# Patient Record
Sex: FEMALE | Race: WHITE | Employment: UNEMPLOYED | URBAN - METROPOLITAN AREA
[De-identification: names, ages, dates, MRNs, and addresses within clinical notes are randomized per-mention and may not be internally consistent; named-entity substitution may affect disease eponyms.]

---

## 2018-01-23 ENCOUNTER — APPOINTMENT (OUTPATIENT)
Dept: PHYSICAL THERAPY | Facility: CLINIC | Age: 53
End: 2018-01-23
Payer: COMMERCIAL

## 2018-01-23 PROCEDURE — 97162 PT EVAL MOD COMPLEX 30 MIN: CPT

## 2018-01-23 PROCEDURE — G8979 MOBILITY GOAL STATUS: HCPCS

## 2018-01-23 PROCEDURE — G8978 MOBILITY CURRENT STATUS: HCPCS

## 2018-02-15 ENCOUNTER — EVALUATION (OUTPATIENT)
Dept: PHYSICAL THERAPY | Facility: CLINIC | Age: 53
End: 2018-02-15
Payer: COMMERCIAL

## 2018-02-15 DIAGNOSIS — M25.551 RIGHT HIP PAIN: Primary | ICD-10-CM

## 2018-02-15 PROCEDURE — 97161 PT EVAL LOW COMPLEX 20 MIN: CPT

## 2018-02-15 PROCEDURE — G8978 MOBILITY CURRENT STATUS: HCPCS

## 2018-02-15 PROCEDURE — G8979 MOBILITY GOAL STATUS: HCPCS

## 2018-02-15 NOTE — PROGRESS NOTES
PT Re-Evaluation     Today's date: 2/15/2018  Patient name: Al Mesa  : 1965  MRN: [id number]  Referring provider: Reshma Duval MD  Dx:   Encounter Diagnosis   Name Primary?  Right hip pain Yes                  Assessment    Assessment details: Pt is a pleasant 50 y o  female who presents to [facility] PT s/p R hip arthroscopic repair for meniscus on 2018  Today, she presents with decreased and painful R hip ROM, R hip and core strength/stability, high self reports of pain w/ activity, and decreased tolerance to all activities  She is not yet driving on own  Functionally, she is limited in her ability to stand and ambulate, perform functional transfers, negotiate stairs, sleep on R side, and perform normal recreational activities  She is limited by low back pain at this time as well  Pt will benefit from skilled PT to address the aforementioned deficits and limitations in an effort to maximize pain free functional mobility and overall quality of life  Progress as able with these goals in mind  Understanding of Dx/Px/POC: good   Prognosis: good    Goals  Short term goals (3 weeks):  1) Pt will improve R hip ROM deficits by at least 10 degrees each pain free  2) Pt will improve R hip and core strength deficits by 1/3 grade MMT  3)  Pt will minimize soft tissue density restrictions through R anterior/lateral/posterior hip  4) Pt will initiate and progress HEP w/ special emphasis on     Long term goals (6 weeks)  1) Pt will improve FOTO to at least 70  2) Pt will stand and ambulate for durations greater than 30 min w/ normalized gait mechanics and pain <3/10 throughout  3) Pt will be functionally unlimited w/ stair climbing     4) Pt will be independent and compliant w/ HEP in order to maximize functional benefit of skilled PT following d/c        Plan  Patient would benefit from: skilled PT  Planned modality interventions: cryotherapy and thermotherapy: hydrocollator packs  Planned therapy interventions: activity modification, abdominal trunk stabilization, manual therapy, massage, transfer training, therapeutic training, therapeutic exercise, therapeutic activities, stretching, strengthening, patient education, neuromuscular re-education, home exercise program, graded exercise, graded activity, gait training, functional ROM exercises, flexibility, body mechanics training and balance  Frequency: 2x week  Duration in visits: 12  Duration in weeks: 6  Treatment plan discussed with: patient  Plan details: POC to include: heat or active w/u, B LE stretching, supine core stab and hip stab, standing gait and balance training, stair training     HEP to start: add squeezes, bridging, SLR, sit<>stands          Subjective Evaluation    History of Present Illness  Date of surgery: 2018  Mechanism of injury: Pt had arthroscopic surgery on 2018 to repair torn labrum  Was seen previously for crutch training session prior to surgery  Born with congenital hip dysplasia and notes that this caused pain for many years  Pt initially felt really good following surgery, has been more sore over the last week  Notes weakness in R hip that causes difficulty w/ movement, especially walking  Pain only comes when she tires and her mechanics change  Excited to get into formal therapy   Functional status is as follows:   Quality of life: good    Pain  Current pain ratin  At best pain ratin  At worst pain ratin  Location: R lateral hip  Quality: throbbing, sharp and burning  Relieving factors: medications, rest and change in position  Aggravating factors: standing, walking and stair climbing  Progression: improved    Social Support  Steps to enter house: yes  Stairs in house: yes   Lives in: multiple-level home  Lives with: spouse    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, works in school system )    Diagnostic Tests  No diagnostic tests performed  Patient Goals  Patient goals for therapy: increased strength, decreased pain, increased motion, return to sport/leisure activities and return to work  Patient goal: I would like to walk without being afraid of falling, I want to get my socks on without help  Objective     Palpation     Additional Palpation Details  Increased tissue density through R HF and glutes, min/mod TTP through R anterior and lateral hip  Neurological Testing     Sensation     Hip   Left Hip   Intact: light touch    Right Hip   Intact: light touch    Active Range of Motion   Left Hip   Normal active range of motion    Right Hip   Flexion: 95 degrees   Abduction: 35 degrees   External rotation (90/90): 45 degrees   Internal rotation (90/90): 18 degrees     Additional Active Range of Motion Details  L hip ROM limited by low back pain     Passive Range of Motion   Left Hip   Normal passive range of motion    Right Hip   Flexion: 105 degrees   Abduction: 40 degrees     Strength/Myotome Testing     Left Hip   Normal muscle strength    Right Hip   Planes of Motion   Flexion: 4-  Extension: 3+  Abduction: 3+  External rotation: 4-  Internal rotation: 4-    Isolated Muscles   Gluteus medius: 3+    Additional Strength Details  Core: no greater than 2/5 at this time, limited by low back discomfort  Ambulation     Ambulation: Level Surfaces     Additional Level Surfaces Ambulation Details  Mod R sided Trendelenburg, minimally antalgic, decreased step length and WB through R LE     Functional Assessment     Comments  Sit<>stand: decreased WB through R Le, min UE support, min antalgic, tires quickly        Precautions: Born w/ hip dysplasia     Daily Treatment Diary     Manual              LAD             "the stick" to quad, ITB                                                        Exercise Diary              SLR w/ QS             bridging             Hip add squeeze             Supine single leg fall outs (gentle)             Standing mini squats             Hip abd and ext             pball isos             pball squats              Stair training              Marching                                                                                                                                                    Modalities

## 2018-02-15 NOTE — LETTER
2018    Sammy Skinner MD  [address]  [address]  [address] [place]    Patient: Tish Galarza   YOB: 1965   Date of Visit: 2/15/2018     Encounter Diagnosis     ICD-10-CM    1  Right hip pain M25 551        Dear Dr Rhona Pace:    Please review the attached Plan of Care from Tish Galarza recent visit  Please verify that you agree therapy should continue by signing the attached document and sending it back to our office  If you have any questions or concerns, please don't hesitate to call  Sincerely,    Romelia Ham, PT      Referring Provider:      I certify that I have read the below Plan of Care and certify the need for these services furnished under this plan of treatment while under my care  Sammy Skinner MD  [address]  [facility] [address]: [phone]          PT Re-Evaluation     Today's date: 2/15/2018  Patient name: Tish Galarza  : 1965  MRN: [id number]  Referring provider: Cr Varghese MD  Dx:   Encounter Diagnosis   Name Primary?  Right hip pain Yes                  Assessment    Assessment details: Pt is a pleasant 50 y o  female who presents to [facility] PT s/p R hip arthroscopic repair for meniscus on 2018  Today, she presents with decreased and painful R hip ROM, R hip and core strength/stability, high self reports of pain w/ activity, and decreased tolerance to all activities  She is not yet driving on own  Functionally, she is limited in her ability to stand and ambulate, perform functional transfers, negotiate stairs, sleep on R side, and perform normal recreational activities  She is limited by low back pain at this time as well  Pt will benefit from skilled PT to address the aforementioned deficits and limitations in an effort to maximize pain free functional mobility and overall quality of life   Progress as able with these goals in mind        Understanding of Dx/Px/POC: good   Prognosis: good    Goals  Short term goals (3 weeks):  1) Pt will improve R hip ROM deficits by at least 10 degrees each pain free  2) Pt will improve R hip and core strength deficits by 1/3 grade MMT  3)  Pt will minimize soft tissue density restrictions through R anterior/lateral/posterior hip  4) Pt will initiate and progress HEP w/ special emphasis on     Long term goals (6 weeks)  1) Pt will improve FOTO to at least 70  2) Pt will stand and ambulate for durations greater than 30 min w/ normalized gait mechanics and pain <3/10 throughout  3) Pt will be functionally unlimited w/ stair climbing  4) Pt will be independent and compliant w/ HEP in order to maximize functional benefit of skilled PT following d/c        Plan  Patient would benefit from: skilled PT  Planned modality interventions: cryotherapy and thermotherapy: hydrocollator packs  Planned therapy interventions: activity modification, abdominal trunk stabilization, manual therapy, massage, transfer training, therapeutic training, therapeutic exercise, therapeutic activities, stretching, strengthening, patient education, neuromuscular re-education, home exercise program, graded exercise, graded activity, gait training, functional ROM exercises, flexibility, body mechanics training and balance  Frequency: 2x week  Duration in visits: 12  Duration in weeks: 6  Treatment plan discussed with: patient  Plan details: POC to include: heat or active w/u, B LE stretching, supine core stab and hip stab, standing gait and balance training, stair training     HEP to start: add squeezes, bridging, SLR, sit<>stands          Subjective Evaluation    History of Present Illness  Date of surgery: 1/30/2018  Mechanism of injury: Pt had arthroscopic surgery on 1/30/2018 to repair torn labrum  Was seen previously for crutch training session prior to surgery   Born with congenital hip dysplasia and notes that this caused pain for many years  Pt initially felt really good following surgery, has been more sore over the last week  Notes weakness in R hip that causes difficulty w/ movement, especially walking  Pain only comes when she tires and her mechanics change  Excited to get into formal therapy  Functional status is as follows:   Quality of life: good    Pain  Current pain ratin  At best pain ratin  At worst pain ratin  Location: R lateral hip  Quality: throbbing, sharp and burning  Relieving factors: medications, rest and change in position  Aggravating factors: standing, walking and stair climbing  Progression: improved    Social Support  Steps to enter house: yes  Stairs in house: yes   Lives in: multiple-level home  Lives with: spouse    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, works in school system )    Diagnostic Tests  No diagnostic tests performed  Patient Goals  Patient goals for therapy: increased strength, decreased pain, increased motion, return to sport/leisure activities and return to work  Patient goal: I would like to walk without being afraid of falling, I want to get my socks on without help  Objective     Palpation     Additional Palpation Details  Increased tissue density through R HF and glutes, min/mod TTP through R anterior and lateral hip      Neurological Testing     Sensation     Hip   Left Hip   Intact: light touch    Right Hip   Intact: light touch    Active Range of Motion   Left Hip   Normal active range of motion    Right Hip   Flexion: 95 degrees   Abduction: 35 degrees   External rotation (90/90): 45 degrees   Internal rotation (90/90): 18 degrees     Additional Active Range of Motion Details  L hip ROM limited by low back pain     Passive Range of Motion   Left Hip   Normal passive range of motion    Right Hip   Flexion: 105 degrees   Abduction: 40 degrees     Strength/Myotome Testing     Left Hip   Normal muscle strength    Right Hip   Planes of Motion   Flexion: 4-  Extension: 3+  Abduction: 3+  External rotation: 4-  Internal rotation: 4-    Isolated Muscles   Gluteus medius: 3+    Additional Strength Details  Core: no greater than 2/5 at this time, limited by low back discomfort  Ambulation     Ambulation: Level Surfaces     Additional Level Surfaces Ambulation Details  Mod R sided Trendelenburg, minimally antalgic, decreased step length and WB through R LE     Functional Assessment     Comments  Sit<>stand: decreased WB through R Le, min UE support, min antalgic, tires quickly        Precautions: Born w/ hip dysplasia     Daily Treatment Diary     Manual              LAD             "the stick" to quad, ITB                                                        Exercise Diary              SLR w/ QS             bridging             Hip add squeeze             Supine single leg fall outs (gentle)             Standing mini squats             Hip abd and ext             pball isos             pball squats              Stair training              Marching                                                                                                                                                    Modalities

## 2018-02-16 ENCOUNTER — TRANSCRIBE ORDERS (OUTPATIENT)
Dept: PHYSICAL THERAPY | Facility: CLINIC | Age: 53
End: 2018-02-16

## 2018-02-16 DIAGNOSIS — M25.551 RIGHT HIP PAIN: Primary | ICD-10-CM

## 2018-02-20 ENCOUNTER — OFFICE VISIT (OUTPATIENT)
Dept: PHYSICAL THERAPY | Facility: CLINIC | Age: 53
End: 2018-02-20
Payer: COMMERCIAL

## 2018-02-20 DIAGNOSIS — M25.551 RIGHT HIP PAIN: Primary | ICD-10-CM

## 2018-02-20 PROCEDURE — 97110 THERAPEUTIC EXERCISES: CPT

## 2018-02-20 PROCEDURE — 97140 MANUAL THERAPY 1/> REGIONS: CPT

## 2018-02-20 PROCEDURE — 97112 NEUROMUSCULAR REEDUCATION: CPT

## 2018-02-20 NOTE — PROGRESS NOTES
Daily Note     Today's date: 2018  Patient name: Deette Boast  : 1965  MRN: [id number]  Referring provider: Karl Garza MD  Dx:   Encounter Diagnosis     ICD-10-CM    1  Right hip pain M25 551                   Subjective: Pt reports soreness deep in R hip, not acutely painful but she is sore  Has been keeping up w/ exercises faithfully  Objective: Pt shows good form w/ pball mini squats through 25-33% of ROM, further ranges not attempted at this time  Precautions: Born w/ hip dysplasia     Daily Treatment Diary     Manual              LAD 2x2 min hold onR            "the stick" to quad, ITB, HS on R x8 mins total                                                       Exercise Diary              SLR w/ QS             bridging 2/ add squeeze 2x10            Hip add squeeze 3 sec hold x 20            Supine single leg fall outs (gentle) YTB 2x10 B            Standing mini squats             pball iso hip ext 2x10 B, marching 2x10 B            pball isos 3 sec x 10            pball squats  x30 total through 33%            Stair training              Marching              Self HF stretch in standing x30 sec            Self HS stretching sitting x30 sec            Stationary bike pre  x7 min                                                                                                            Modalities                                                         Assessment: Tolerated treatment well  Patient demonstrated fatigue post treatment and would benefit from continued PT  She shows good glute activation w/ continued reps and cueing, she does well to verbalize importance of glute strength throughout  She notes fatigue by end of session but shows good form  Continue w/ gentle progressions as able  Plan: Continue per plan of care   Pball hip abd, standing mini squats, heel raises, gastroc stretching, pball bridging, core stab

## 2018-02-20 NOTE — LETTER
2018    Venessa Benitez MD  [address]  [address]  [address] [facility]    Patient: Shalonda Gillette   YOB: 1965   Date of Visit: 2018     Encounter Diagnosis     ICD-10-CM    1  Right hip pain M25 551        Dear Dr Violetta Berry:    Please review the attached Plan of Care from Shalonda Gillette recent visit  Please verify that you agree therapy should continue by signing the attached document and sending it back to our office  If you have any questions or concerns, please don't hesitate to call  Sincerely,    Beth Burrows, PT      Referring Provider:      I certify that I have read the below Plan of Care and certify the need for these services furnished under this plan of treatment while under my care  Venessa Benitez MD  [address]  [facility] [address]: [phone]          Daily Note     Today's date: 2018  Patient name: Shalonda Gillette  : 1965  MRN: [id number]  Referring provider: Dre Silva MD  Dx:   Encounter Diagnosis     ICD-10-CM    1  Right hip pain M25 551                   Subjective: Pt reports soreness deep in R hip, not acutely painful but she is sore  Has been keeping up w/ exercises faithfully  Objective: Pt shows good form w/ pball mini squats through 25-33% of ROM, further ranges not attempted at this time      Precautions: Born w/ hip dysplasia     Daily Treatment Diary     Manual              LAD 2x2 min hold onR            "the stick" to quad, ITB, HS on R x8 mins total                                                       Exercise Diary              SLR w/ QS             bridging 2/ add squeeze 2x10            Hip add squeeze 3 sec hold x 20            Supine single leg fall outs (gentle) YTB 2x10 B            Standing mini squats             pball iso hip ext 2x10 B, marching 2x10 B            pball isos 3 sec x 10            pball squats  x30 total through 33%            Stair training              Marching              Self HF stretch in standing x30 sec            Self HS stretching sitting x30 sec            Stationary bike pre  x7 min                                                                                                            Modalities                                                         Assessment: Tolerated treatment well  Patient demonstrated fatigue post treatment and would benefit from continued PT  She shows good glute activation w/ continued reps and cueing, she does well to verbalize importance of glute strength throughout  She notes fatigue by end of session but shows good form  Continue w/ gentle progressions as able  Plan: Continue per plan of care   Pball hip abd, standing mini squats, heel raises, gastroc stretching, pball bridging, core stab

## 2018-02-21 ENCOUNTER — TRANSCRIBE ORDERS (OUTPATIENT)
Dept: PHYSICAL THERAPY | Facility: CLINIC | Age: 53
End: 2018-02-21

## 2018-02-21 DIAGNOSIS — M25.551 RIGHT HIP PAIN: Primary | ICD-10-CM

## 2018-02-22 ENCOUNTER — APPOINTMENT (OUTPATIENT)
Dept: PHYSICAL THERAPY | Facility: CLINIC | Age: 53
End: 2018-02-22
Payer: COMMERCIAL

## 2018-02-23 ENCOUNTER — OFFICE VISIT (OUTPATIENT)
Dept: PHYSICAL THERAPY | Facility: CLINIC | Age: 53
End: 2018-02-23
Payer: COMMERCIAL

## 2018-02-23 DIAGNOSIS — M25.551 RIGHT HIP PAIN: Primary | ICD-10-CM

## 2018-02-23 PROCEDURE — 97112 NEUROMUSCULAR REEDUCATION: CPT

## 2018-02-23 PROCEDURE — 97140 MANUAL THERAPY 1/> REGIONS: CPT

## 2018-02-23 PROCEDURE — 97110 THERAPEUTIC EXERCISES: CPT

## 2018-02-23 NOTE — PROGRESS NOTES
Daily Note     Today's date: 2018  Patient name: Luisana Meeks  : 1965  MRN: [id number]  Referring provider: Dianna Garcia MD  Dx:   Encounter Diagnosis     ICD-10-CM    1  Right hip pain M25 551                   Subjective: Pt reports pain in lateral and posterior hip  Arrives on crutches today and notes that walking causes increases in pain  Gets sharp pain that "drops me " Putting pressure on it causes pain, pain happens suddenly and with warning  Pt is hesitant to walk as a result  Objective: Pt requires cueing for proper hip drive during mini squats  Improved hip mobility noted following soft tissue work to lateral and posterior R hip  Precautions: Born w/ hip dysplasia     Daily Treatment Diary     Manual             LAD 2x2 min hold onR 2x2 min holds           "the stick" to quad, ITB, HS on R x8 mins total x9 mins                                                       Exercise Diary             SLR w/ QS             bridging 2/ add squeeze 2x10 3x30 w/ add squeeze           Hip add squeeze 3 sec hold x 20            Supine single leg fall outs (gentle) YTB 2x10 B            Standing mini squats  x25 total           pball iso hip ext 2x10 B, marching 2x10 B            pball isos 3 sec x 10            pball squats  x30 total through 33%            Stair training              Marching              Self HF stretch in standing x30 sec            Self HS stretching sitting x30 sec            Stationary bike pre  x7 min  X7 5 min pre             Manual HS , cross body glute stretch 3x30 sec holds each             PROM for R hip flex/ext x5 mins             Manual hip add stretch 3x30 sec holds                                                                   Modalities                                                         Assessment: Tolerated treatment well  Patient would benefit from continued PT   She does well w/ gentle stretching and mobility program  Pt was heavily educated on pain vs soreness and importance of controlled motion so as not to exacerbate sx  Verbalizes solid understanding  Instructed to continue w/ crutches as necessary but attempt to wean off if able  Progress as sx allow at next session  Plan: Continue per plan of care   Standing core and hip stab  gastroc stretching, heel raises

## 2018-02-26 ENCOUNTER — OFFICE VISIT (OUTPATIENT)
Dept: PHYSICAL THERAPY | Facility: CLINIC | Age: 53
End: 2018-02-26
Payer: COMMERCIAL

## 2018-02-26 DIAGNOSIS — M25.551 RIGHT HIP PAIN: Primary | ICD-10-CM

## 2018-02-26 PROCEDURE — 97112 NEUROMUSCULAR REEDUCATION: CPT

## 2018-02-26 PROCEDURE — 97140 MANUAL THERAPY 1/> REGIONS: CPT

## 2018-02-26 NOTE — PROGRESS NOTES
Daily Note     Today's date: 2018  Patient name: Marguerite Habermann  : 1965  MRN: [id number]  Referring provider: Ruby Daly MD  Dx:   Encounter Diagnosis     ICD-10-CM    1  Right hip pain M25 551                   Subjective: Pt reports 5/10 pain to start session, felt good after last session  Slightly less pain today  Notes that walking and sitting are the most difficult at this time  Objective: Pt shows improved ability to march and bear weight through single leg w/ abdominal isometric hold on pball  Precautions: Born w/ hip dysplasia     Daily Treatment Diary     Manual            LAD 2x2 min hold onR 2x2 min holds 2x2 mins holds          "the stick" to quad, ITB, HS on R x8 mins total x9 mins  x7 mins                                                      Exercise Diary            SLR w/ QS             bridging 2/ add squeeze 2x10 3x30 w/ add squeeze 3x30 sec w/ add squeeze          Hip add squeeze 3 sec hold x 20  5 sec hold 2x10           Supine single leg fall outs (gentle) YTB 2x10 B            Standing mini squats  x25 total x30 total          pball iso hip ext 2x10 B, marching 2x10 B  marching and hip abd 2x10          pball isos 3 sec x 10  3 sec x 10          pball squats  x30 total through 33%            Stair training              Marching              Self HF stretch in standing x30 sec            Self HS stretching sitting x30 sec            Stationary bike pre  x7 min  X7 5 min pre x8 min pre            Manual HS , cross body glute stretch 3x30 sec holds each Manual HS and hip add stretch 3x30 sec holds on R            PROM for R hip flex/ext x5 mins PROM for R hip flex/ext x5 mins            Manual hip add stretch 3x30 sec holds Self gastroc stretch 3x30 sec holds B                                                                   Modalities                                                           Assessment: Tolerated treatment well  Patient demonstrated fatigue post treatment and would benefit from continued PT  She shows good control over movements throughout, noting pain that is tolerable and is well controlled w/ pacing and rest  She is appropriate for higher level strengthening and gait training barring any setback in sx at next session  Plan: Continue per plan of care   Attempt gentle sit<>stand, gentle gait training, gentle core strengthening at higher levels

## 2018-02-28 ENCOUNTER — OFFICE VISIT (OUTPATIENT)
Dept: PHYSICAL THERAPY | Facility: CLINIC | Age: 53
End: 2018-02-28
Payer: COMMERCIAL

## 2018-02-28 DIAGNOSIS — M25.551 RIGHT HIP PAIN: Primary | ICD-10-CM

## 2018-02-28 PROCEDURE — 97110 THERAPEUTIC EXERCISES: CPT

## 2018-02-28 PROCEDURE — 97112 NEUROMUSCULAR REEDUCATION: CPT

## 2018-02-28 NOTE — PROGRESS NOTES
Daily Note     Today's date: 2018  Patient name: Wing Smith  : 1965  MRN: [id number]  Referring provider: Candy Santoro MD  Dx:   Encounter Diagnosis     ICD-10-CM    1  Right hip pain M25 551                   Subjective: Pt reports 6/10 pain to start session  Slightly more sore today but felt good after last session  Feels that her hip tightens up quickly and that this is the major limiting factor as of now  Objective: Pt requires cueing to decrease dynmaic genu valgum w/ 4" step ups, mod correction  [place] of WB w/ pball squats improved w/ continued reps         Precautions: Born w/ hip dysplasia     Daily Treatment Diary     Manual           LAD 2x2 min hold onR 2x2 min holds 2x2 mins holds x2 mins on R          "the stick" to quad, ITB, HS on R x8 mins total x9 mins  x7 mins                                                      Exercise Diary           SLR w/ QS             bridging 2/ add squeeze 2x10 3x30 w/ add squeeze 3x30 sec w/ add squeeze 3x10 w/ add squeeze         Hip add squeeze 3 sec hold x 20  5 sec hold 2x10           Supine single leg fall outs (gentle) YTB 2x10 B   clam shells x 30         Standing mini squats  x25 total x30 total x20 total         pball iso hip ext 2x10 B, marching 2x10 B  marching and hip abd 2x10          pball isos 3 sec x 10  3 sec x 10          pball squats  x30 total through 33%   2x10, focus on glute drive         Stair training     4" step ups 2x10 on R, min UE support         Marching              Self HF stretch in standing x30 sec   reviewed          Self HS stretching sitting x30 sec   reviewed          Stationary bike pre  x7 min  X7 5 min pre x8 min pre x8 min pre, lvl 3           Manual HS , cross body glute stretch 3x30 sec holds each Manual HS and hip add stretch 3x30 sec holds on R Manual HS and HF stretch 3x30 sec holds on R           PROM for R hip flex/ext x5 mins PROM for R hip flex/ext x5 mins PROM for R hip flex/ext x5 mins           Manual hip add stretch 3x30 sec holds Self gastroc stretch 3x30 sec holds B               YTB abd walk 4x50'                                                    Modalities                                                             Assessment: Tolerated treatment well  Patient demonstrated fatigue post treatment and would benefit from continued PT  Excellent tolerance today  She does well to take cueing to increase force of glute and hip stabilizer contraction to improve quality of functional movement  She fatigues quickly w/ lateral hip strengthening but paces well  R lateral trunk lean during 4" step ups improves w/ reps  Increase intensity of activities as able at next session  Plan: Continue per plan of care  step downs, lateral tap downs, heel raises, review HEP

## 2018-03-06 ENCOUNTER — OFFICE VISIT (OUTPATIENT)
Dept: PHYSICAL THERAPY | Facility: CLINIC | Age: 53
End: 2018-03-06
Payer: COMMERCIAL

## 2018-03-06 DIAGNOSIS — M25.551 RIGHT HIP PAIN: Primary | ICD-10-CM

## 2018-03-06 PROCEDURE — 97140 MANUAL THERAPY 1/> REGIONS: CPT

## 2018-03-06 PROCEDURE — 97110 THERAPEUTIC EXERCISES: CPT

## 2018-03-06 PROCEDURE — 97112 NEUROMUSCULAR REEDUCATION: CPT

## 2018-03-09 ENCOUNTER — OFFICE VISIT (OUTPATIENT)
Dept: PHYSICAL THERAPY | Facility: CLINIC | Age: 53
End: 2018-03-09
Payer: COMMERCIAL

## 2018-03-09 DIAGNOSIS — M25.551 RIGHT HIP PAIN: Primary | ICD-10-CM

## 2018-03-09 PROCEDURE — G8978 MOBILITY CURRENT STATUS: HCPCS

## 2018-03-09 PROCEDURE — 97112 NEUROMUSCULAR REEDUCATION: CPT

## 2018-03-09 PROCEDURE — G8979 MOBILITY GOAL STATUS: HCPCS

## 2018-03-09 PROCEDURE — 97110 THERAPEUTIC EXERCISES: CPT

## 2018-03-09 NOTE — PROGRESS NOTES
PT Re-Evaluation     Today's date: 3/9/2018  Patient name: Dennie Long  : 1965  MRN: [id number]  Referring provider: Lorrayne Cooks, MD  Dx:   Encounter Diagnosis   Name Primary?  Right hip pain Yes                  Assessment    Assessment details: Pt is a pleasant 50 y o  female who presents to [place] [address] PT s/p R hip arthroscopic repair for meniscus on 2018  Today, she presents with decreased and painful R hip ROM, R hip and core strength/stability, high self reports of pain w/ activity, and decreased tolerance to all activities  She is not yet driving on own  Functionally, she is limited in her ability to stand and ambulate, perform functional transfers, negotiate stairs, sleep on R side, and perform normal recreational activities  She is limited by low back pain at this time as well  Pt will benefit from skilled PT to address the aforementioned deficits and limitations in an effort to maximize pain free functional mobility and overall quality of life  Progress as able with these goals in mind  RE 3/9/2018: Pt has been re-evaluated following 7 total skilled therapy visits  She has made good progress to date  Pain reports are higher and ROM measurements are slightly lower, however she has made good progress in terms of strength and functional mobility  Pain has decreased slightly over the last week, she is better ambulate today w/o AD as compared to previous sessions  She still stands to gain in terms of R hip ROM and joint mobility, B LE and core strength/stability, tolerance to functional activities, and quality of functional movements  She remains very motivated to improve and will benefit from continued skilled services        Understanding of Dx/Px/POC: good   Prognosis: good    Goals  Short term goals (3 weeks):  1) Pt will improve R hip ROM deficits by at least 10 degrees each pain free - progressed   2) Pt will improve R hip and core strength deficits by 1/3 grade MMT  - progressed  3)  Pt will minimize soft tissue density restrictions through R anterior/lateral/posterior hip  - progressed  4) Pt will initiate and progress HEP w/ special emphasis on pain free functional strength and ROM - achieved     Long term goals (6 weeks)  1) Pt will improve FOTO to at least 70  - no progress  2) Pt will stand and ambulate for durations greater than 30 min w/ normalized gait mechanics and pain <3/10 throughout  - progressed  3) Pt will be functionally unlimited w/ stair climbing  - progressed   4) Pt will be independent and compliant w/ HEP in order to maximize functional benefit of skilled PT following d/c  - progressed       Plan  Patient would benefit from: skilled PT  Planned modality interventions: cryotherapy and thermotherapy: hydrocollator packs  Planned therapy interventions: activity modification, abdominal trunk stabilization, manual therapy, massage, transfer training, therapeutic training, therapeutic exercise, therapeutic activities, stretching, strengthening, patient education, neuromuscular re-education, home exercise program, graded exercise, graded activity, gait training, functional ROM exercises, flexibility, body mechanics training and balance  Frequency: 2x week  Duration in visits: 12  Duration in weeks: 6  Treatment plan discussed with: patient  Plan details: POC will progress to include more aggressive ROM and strengthening as tolerated, higher level functional movement training         Subjective Evaluation    History of Present Illness  Date of surgery: 1/30/2018  Mechanism of injury: Pt had arthroscopic surgery on 1/30/2018 to repair torn labrum  Was seen previously for crutch training session prior to surgery  Born with congenital hip dysplasia and notes that this caused pain for many years  Pt initially felt really good following surgery, has been more sore over the last week  Notes weakness in R hip that causes difficulty w/ movement, especially walking   Pain only comes when she tires and her mechanics change  Excited to get into formal therapy  Functional status is as follows:     RE 3/9/2018: Pt feels that she is improving in her ability to ambulate, especially over the last week  Pt notes that pain on average is worse but she feels stronger and more mobile  Pain increased the week following surgery, feels that initial pain values were low as she felt great then  Pt feels she is about 60% better  She will follow up w/ MD next week  Updated functional status to follow:   Quality of life: good    Pain  Current pain ratin  At best pain ratin  At worst pain rating: 10  Location: R lateral hip  Quality: throbbing, sharp and burning  Relieving factors: medications, rest and change in position  Aggravating factors: standing, walking and stair climbing  Progression: improved    Social Support  Steps to enter house: yes  Stairs in house: yes   Lives in: multiple-level home  Lives with: spouse    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, works in school system )    Diagnostic Tests  No diagnostic tests performed  Patient Goals  Patient goals for therapy: increased strength, decreased pain, increased motion, return to sport/leisure activities and return to work  Patient goal: I would like to walk without being afraid of falling, I want to get my socks on without help  Objective     Palpation     Additional Palpation Details  Mod increased tissue density through R piri and glutes, min through R HF, mod TTP through R anterior and lateral hip      Neurological Testing     Sensation     Hip   Left Hip   Intact: light touch    Right Hip   Intact: light touch    Active Range of Motion   Left Hip   Normal active range of motion    Right Hip   Flexion: 98 degrees   Abduction: 28 degrees   External rotation (90/90): 40 degrees   Internal rotation (90/90): 25 degrees     Additional Active Range of Motion Details  L hip ROM limited by low back pain     Passive Range of Motion Left Hip   Normal passive range of motion    Right Hip   Flexion: 108 degrees   Abduction: 38 degrees     Strength/Myotome Testing     Left Hip   Normal muscle strength    Right Hip   Planes of Motion   Flexion: 4  Extension: 4-  Abduction: 4-  External rotation: 4  Internal rotation: 4    Isolated Muscles   Gluteus medius: 4-    Additional Strength Details  Core: no greater than 2/5 at this time, limited by low back discomfort  Further progressions held at RE s/t hip discomfort  Ambulation     Ambulation: Level Surfaces     Additional Level Surfaces Ambulation Details  Mod antalgic, R toe in, R sided trunk lean, R sided Trendelenburg, decreased step length and speed on R  Pt able to ambulate w/o crutches but prefers crutches     Functional Assessment     Comments  Sit<>stand: decreased WB through R Le, min UE support, min antalgic, tires quickly   (continued at RE)        Precautions: Born w/ hip dysplasia     Daily Treatment Diary     Manual  2/20 2/23 2/26 2/28 3/6 3/9       LAD 2x2 min hold onR 2x2 min holds 2x2 mins holds x2 mins on R  x2 mins x2 mins        "the stick" to quad, ITB, HS on R x8 mins total x9 mins  x7 mins   x6 mins                                                    Exercise Diary  2/20 2/23 2/26 2/28 3/6 3/9       SLR w/ QS      2x10 on R       bridging 2/ add squeeze 2x10 3x30 w/ add squeeze 3x30 sec w/ add squeeze 3x10 w/ add squeeze 3x10 w/ add squeeze 3x10 w/ add squeeze       Hip add squeeze 3 sec hold x 20  5 sec hold 2x10    x20       Supine single leg fall outs (gentle) YTB 2x10 B   clam shells x 30 s/l clam shells x 30 Gait training focus on proper foot and knee position x 4 mins       Standing mini squats  x25 total x30 total x20 total x30 at bar standing hip ER unweighted x 20       pball iso hip ext 2x10 B, marching 2x10 B  marching and hip abd 2x10  marching w/ 3 sec ecc fall 3x10 on R only        pball isos 3 sec x 10  3 sec x 10  standing hip abd and ext kicks 3x10 pball squats  x30 total through 33%   2x10, focus on glute drive         Stair training     4" step ups 2x10 on R, min UE support         Marching              Self HF stretch in standing x30 sec   reviewed  reviewed        Self HS stretching sitting x30 sec   reviewed  reviewed        Stationary bike pre  x7 min  X7 5 min pre x8 min pre x8 min pre, lvl 3 x8 min pre, lvl 3 7 min pre lvl 3          Manual HS , cross body glute stretch 3x30 sec holds each Manual HS and hip add stretch 3x30 sec holds on R Manual HS and HF stretch 3x30 sec holds on R Manual HS and HF stretch 3x30 sec holds on R Manual HS and HF stretch 3x30 sec holds on R         PROM for R hip flex/ext x5 mins PROM for R hip flex/ext x5 mins PROM for R hip flex/ext x5 mins PROM for R hip flex/ext x5 mins PROM for R hip flex/ext x5 mins         Manual hip add stretch 3x30 sec holds Self gastroc stretch 3x30 sec holds B               YTB abd walk 4x50'              Heel raises x 30 total              Alter-G TM 60% 1 2 x 7 mins              Review of HEP           Modalities                                                        Next time: pball squats, alter-g, steps, gait training

## 2018-03-12 ENCOUNTER — OFFICE VISIT (OUTPATIENT)
Dept: PHYSICAL THERAPY | Facility: CLINIC | Age: 53
End: 2018-03-12
Payer: COMMERCIAL

## 2018-03-12 DIAGNOSIS — M25.551 RIGHT HIP PAIN: Primary | ICD-10-CM

## 2018-03-12 PROCEDURE — 97112 NEUROMUSCULAR REEDUCATION: CPT

## 2018-03-12 PROCEDURE — 97140 MANUAL THERAPY 1/> REGIONS: CPT

## 2018-03-12 NOTE — PROGRESS NOTES
Daily Note     Today's date: 3/12/2018  Patient name: Sue Renner  : 1965  MRN: [id number]  Referring provider: Disha Calix MD  Dx:   Encounter Diagnosis     ICD-10-CM    1  Right hip pain M25 551                   Subjective: Pt reports 5-6/10 pain in R hip to start session  Felt good after last session  Getting longer periods with decreased pain overall  Objective: Pt requires min UE support and cueing for upright trunk positioning during step ups but corrects well  Significant soft tissue density adhesions noted in R anterior and posterior hip well reduced w/ manual release        Precautions: Born w/ hip dysplasia     Daily Treatment Diary     Manual  2/20 2/23 2/26 2/28 3/6 3/9 3/12      LAD 2x2 min hold onR 2x2 min holds 2x2 mins holds x2 mins on R  x2 mins x2 mins  x2 mins       "the stick" to quad, ITB, HS on R x8 mins total x9 mins  x7 mins   x6 mins   manual piri and HF release x 6 mins total                                                 Exercise Diary  2/20 2/23 2/26 2/28 3/6 3/9 3/12      SLR w/ QS      2x10 on R 2x10 on R       bridging 2/ add squeeze 2x10 3x30 w/ add squeeze 3x30 sec w/ add squeeze 3x10 w/ add squeeze 3x10 w/ add squeeze 3x10 w/ add squeeze 3x10 w/ add squeeze      Hip add squeeze 3 sec hold x 20  5 sec hold 2x10    x20       Supine single leg fall outs (gentle) YTB 2x10 B   clam shells x 30 s/l clam shells x 30 Gait training focus on proper foot and knee position x 4 mins Gait training focus on proper foot and knee position x 4 mins      Standing mini squats  x25 total x30 total x20 total x30 at bar standing hip ER unweighted x 20 squats at bar x 20      pball iso hip ext 2x10 B, marching 2x10 B  marching and hip abd 2x10  marching w/ 3 sec ecc fall 3x10 on R only  marching w/ 3 sec ecc fall 3x10 on R only      pball isos 3 sec x 10  3 sec x 10  standing hip abd and ext kicks 3x10        pball squats  x30 total through 33%   2x10, focus on glute drive Stair training     4" step ups 2x10 on R, min UE support   6" step ups focus on hip drive       Marching              Self HF stretch in standing x30 sec   reviewed  reviewed        Self HS stretching sitting x30 sec   reviewed  reviewed        Stationary bike pre  x7 min  X7 5 min pre x8 min pre x8 min pre, lvl 3 x8 min pre, lvl 3 7 min pre lvl 3  7 min pre lvl 4         Manual HS , cross body glute stretch 3x30 sec holds each Manual HS and hip add stretch 3x30 sec holds on R Manual HS and HF stretch 3x30 sec holds on R Manual HS and HF stretch 3x30 sec holds on R Manual HS and HF stretch 3x30 sec holds on R Manual HS and HF stretch 3x30 sec holds on R        PROM for R hip flex/ext x5 mins PROM for R hip flex/ext x5 mins PROM for R hip flex/ext x5 mins PROM for R hip flex/ext x5 mins PROM for R hip flex/ext x5 mins         Manual hip add stretch 3x30 sec holds Self gastroc stretch 3x30 sec holds B               YTB abd walk 4x50'   Prone ext lifts w/ knee bent 2x10            Heel raises x 30 total  YTB abd walking 50'x3            Alter-G TM 60% 1 2 x 7 mins              Review of HEP Review of HEP          Modalities                                                          Assessment: Tolerated treatment well  Patient demonstrated fatigue post treatment and would benefit from continued PT  She does well w/ manual release to HF and piri, noting improved mobility following these techniques  Still showing R sided trunk lean and R toe in during gait but corrects well during gait training  Progress as sx allow at next session  Plan: Continue per plan of care  Higher level strengthening as able

## 2018-03-15 ENCOUNTER — OFFICE VISIT (OUTPATIENT)
Dept: PHYSICAL THERAPY | Facility: CLINIC | Age: 53
End: 2018-03-15
Payer: COMMERCIAL

## 2018-03-15 DIAGNOSIS — M25.551 RIGHT HIP PAIN: Primary | ICD-10-CM

## 2018-03-15 PROCEDURE — 97112 NEUROMUSCULAR REEDUCATION: CPT

## 2018-03-15 PROCEDURE — 97110 THERAPEUTIC EXERCISES: CPT

## 2018-03-15 NOTE — PROGRESS NOTES
Daily Note     Today's date: 3/15/2018  Patient name: Katharine Gaines  : 1965  MRN: [id number]  Referring provider: Gurpreet Hall MD  Dx:   Encounter Diagnosis     ICD-10-CM    1  Right hip pain M25 551                   Subjective: Pt reports that at follow up w/ surgeon she was told she has advanced arthritis in R hip and was recommended for hip replacement  Reports that surgery itself is healing well but continued pain is from arthritis, as confirmed by novicaine injection test  Unsure as to whether she will go through with this yet  Feels PT is helping and that she is getting stronger but still has pain overall  Objective: Improved isolation of hip and trunk motions w/ YTB activities today  Requires less cueing to prevent UE and trunk compensations w/ lateral walks and kicks/marching w/ YTB        Precautions: Born w/ hip dysplasia     Daily Treatment Diary     Manual   3/6 3/9 3/12 3/15     LAD 2x2 min hold onR 2x2 min holds 2x2 mins holds x2 mins on R  x2 mins x2 mins  x2 mins       "the stick" to quad, ITB, HS on R x8 mins total x9 mins  x7 mins   x6 mins   manual piri and HF release x 6 mins total manual piri and HF release x 4 mins total                                                Exercise Diary  2/20 2/23 2/26 2/28 3/6 3/9 3/12 3/15     SLR w/ QS      2x10 on R 2x10 on R       bridging 2/ add squeeze 2x10 3x30 w/ add squeeze 3x30 sec w/ add squeeze 3x10 w/ add squeeze 3x10 w/ add squeeze 3x10 w/ add squeeze 3x10 w/ add squeeze 3x10 w/ add sq     Hip add squeeze 3 sec hold x 20  5 sec hold 2x10    x20       Supine single leg fall outs (gentle) YTB 2x10 B   clam shells x 30 s/l clam shells x 30 Gait training focus on proper foot and knee position x 4 mins Gait training focus on proper foot and knee position x 4 mins X 4 mins total     Standing mini squats  x25 total x30 total x20 total x30 at bar standing hip ER unweighted x 20 squats at bar x 20 At table x 20, pball squats 3x10     pball iso hip ext 2x10 B, marching 2x10 B  marching and hip abd 2x10  marching w/ 3 sec ecc fall 3x10 on R only  marching w/ 3 sec ecc fall 3x10 on R only marching and hip ext w/ pball iso and YTB around knees 2x15 each     pball isos 3 sec x 10  3 sec x 10  standing hip abd and ext kicks 3x10        pball squats  x30 total through 33%   2x10, focus on glute drive    DLHR/DLTR x 30 total each     Stair training     4" step ups 2x10 on R, min UE support   6" step ups focus on hip drive       Marching              Self HF stretch in standing x30 sec   reviewed  reviewed        Self HS stretching sitting x30 sec   reviewed  reviewed        Stationary bike pre  x7 min  X7 5 min pre x8 min pre x8 min pre, lvl 3 x8 min pre, lvl 3 7 min pre lvl 3  7 min pre lvl 4         Manual HS , cross body glute stretch 3x30 sec holds each Manual HS and hip add stretch 3x30 sec holds on R Manual HS and HF stretch 3x30 sec holds on R Manual HS and HF stretch 3x30 sec holds on R Manual HS and HF stretch 3x30 sec holds on R Manual HS and HF stretch 3x30 sec holds on R Manual HS and HF stretch 3x30 sec holds on R       PROM for R hip flex/ext x5 mins PROM for R hip flex/ext x5 mins PROM for R hip flex/ext x5 mins PROM for R hip flex/ext x5 mins PROM for R hip flex/ext x5 mins  x4 mins       Manual hip add stretch 3x30 sec holds Self gastroc stretch 3x30 sec holds B               YTB abd walk 4x50'   Prone ext lifts w/ knee bent 2x10  2x15          Heel raises x 30 total  YTB abd walking 50'x3 50'x3 total           Alter-G TM 60% 1 2 x 7 mins              Review of HEP Review of HEP          Modalities                                                              Assessment: Tolerated treatment well  Patient demonstrated fatigue post treatment and would benefit from continued PT  She shows excellent tolerance to all exercises, noting no increase in sx by end of session   Fatigue evident, but pt paces well to avoid excessive compensation  Pt educated on expected rehab following ROSAM[facility] and verbalizes good understanding  She will take time to decide her future course of action but will continue w/ skilled PT at this time as she feels she is improving  Plan: Continue per plan of care   squats w/ tband around knees, pball isos, leg press

## 2018-03-19 ENCOUNTER — OFFICE VISIT (OUTPATIENT)
Dept: PHYSICAL THERAPY | Facility: CLINIC | Age: 53
End: 2018-03-19
Payer: COMMERCIAL

## 2018-03-19 DIAGNOSIS — M25.551 RIGHT HIP PAIN: Primary | ICD-10-CM

## 2018-03-19 PROCEDURE — 97110 THERAPEUTIC EXERCISES: CPT

## 2018-03-19 PROCEDURE — 97112 NEUROMUSCULAR REEDUCATION: CPT

## 2018-03-19 NOTE — PROGRESS NOTES
Daily Note     Today's date: 3/19/2018  Patient name: Marshall English  : 1965  MRN: [id number]  Referring provider: Liban Loco MD  Dx:   Encounter Diagnosis     ICD-10-CM    1  Right hip pain M25 551                   Subjective: Pt has follow up w/ surgeon on 4/10  She is not sure yet if she wants to go through w/ [facility], she would like to continue w/ skilled PT for strengthening and mobility at least until follow up w/ surgeon to ensure that she is as strong as possible  Objective: Pt shows less trunk lean/compensation during pball core and hip stab, able to maintain upright posture w/ less extraneous movement throughout         Precautions: Born w/ hip dysplasia     Daily Treatment Diary     Manual  2/20 2/23 2/26 2/28 3/6 3/9 3/12 3/15 3/19    LAD 2x2 min hold onR 2x2 min holds 2x2 mins holds x2 mins on R  x2 mins x2 mins  x2 mins   x2 mins total    "the stick" to quad, ITB, HS on R x8 mins total x9 mins  x7 mins   x6 mins   manual piri and HF release x 6 mins total manual piri and HF release x 4 mins total x4 mins total                                                Exercise Diary  2/20 2/23 2/26 2/28 3/6 3/9 3/12 3/15 3/19    SLR w/ QS      2x10 on R 2x10 on R       bridging 2/ add squeeze 2x10 3x30 w/ add squeeze 3x30 sec w/ add squeeze 3x10 w/ add squeeze 3x10 w/ add squeeze 3x10 w/ add squeeze 3x10 w/ add squeeze 3x10 w/ add sq 4x10 w/ add sq    Hip add squeeze 3 sec hold x 20  5 sec hold 2x10    x20       Supine single leg fall outs (gentle) YTB 2x10 B   clam shells x 30 s/l clam shells x 30 Gait training focus on proper foot and knee position x 4 mins Gait training focus on proper foot and knee position x 4 mins X 4 mins total     Standing mini squats  x25 total x30 total x20 total x30 at bar standing hip ER unweighted x 20 squats at bar x 20 At table x 20, pball squats 3x10 x40 at edge of table    pball iso hip ext 2x10 B, marching 2x10 B  marching and hip abd 2x10  marching w/ 3 sec ecc fall 3x10 on R only  marching w/ 3 sec ecc fall 3x10 on R only marching and hip ext w/ pball iso and YTB around knees 2x15 each marching, hip abd, hip ext, heel raises 4x10-15 each    pball isos 3 sec x 10  3 sec x 10  standing hip abd and ext kicks 3x10        pball squats  x30 total through 33%   2x10, focus on glute drive    DLHR/DLTR x 30 total each     Stair training     4" step ups 2x10 on R, min UE support   6" step ups focus on hip drive       Marching              Self HF stretch in standing x30 sec   reviewed  reviewed        Self HS stretching sitting x30 sec   reviewed  reviewed        Stationary bike pre  x7 min  X7 5 min pre x8 min pre x8 min pre, lvl 3 x8 min pre, lvl 3 7 min pre lvl 3  7 min pre lvl 4   7 min lvl 4      Manual HS , cross body glute stretch 3x30 sec holds each Manual HS and hip add stretch 3x30 sec holds on R Manual HS and HF stretch 3x30 sec holds on R Manual HS and HF stretch 3x30 sec holds on R Manual HS and HF stretch 3x30 sec holds on R Manual HS and HF stretch 3x30 sec holds on R Manual HS and HF stretch 3x30 sec holds on R Manual HS and HF [id number] sec holds on R      PROM for R hip flex/ext x5 mins PROM for R hip flex/ext x5 mins PROM for R hip flex/ext x5 mins PROM for R hip flex/ext x5 mins PROM for R hip flex/ext x5 mins  x4 mins x4 mins       Manual hip add stretch 3x30 sec holds Self gastroc stretch 3x30 sec holds B               YTB abd walk 4x50'   Prone ext lifts w/ knee bent 2x10  2x15 4x10         Heel raises x 30 total  YTB abd walking 50'x3 50'x3 total           Alter-G TM 60% 1 2 x 7 mins              Review of HEP Review of HEP          Modalities                                                          Assessment: Tolerated treatment well  Patient demonstrated fatigue post treatment and would benefit from continued PT  She shows fatigue at end of session but no increase in discomfort   She is able to perform all exercises w/ slow and controlled movement  R toe in during normal gait may be the result attempts to reduce pain through arthritic areas within femoral acetabular juncture  Progressions will focus on functional strength as sx allow  Plan: Continue per plan of care   banded hip and core stab as able

## 2018-03-21 ENCOUNTER — APPOINTMENT (OUTPATIENT)
Dept: PHYSICAL THERAPY | Facility: CLINIC | Age: 53
End: 2018-03-21
Payer: COMMERCIAL

## 2018-03-22 ENCOUNTER — OFFICE VISIT (OUTPATIENT)
Dept: PHYSICAL THERAPY | Facility: CLINIC | Age: 53
End: 2018-03-22
Payer: COMMERCIAL

## 2018-03-22 DIAGNOSIS — M25.551 RIGHT HIP PAIN: Primary | ICD-10-CM

## 2018-03-22 PROCEDURE — 97110 THERAPEUTIC EXERCISES: CPT

## 2018-03-22 PROCEDURE — 97112 NEUROMUSCULAR REEDUCATION: CPT

## 2018-03-22 PROCEDURE — 97140 MANUAL THERAPY 1/> REGIONS: CPT

## 2018-03-22 NOTE — PROGRESS NOTES
Daily Note     Today's date: 3/22/2018  Patient name: Dennie Long  : 1965  MRN: [id number]  Referring provider: Lorrayne Cooks, MD  Dx:   Encounter Diagnosis     ICD-10-CM    1  Right hip pain M25 551                   Subjective: Pt reports same sx  R knee is starting to bother her which makes her worried  Objective: Pt displays dynamic genu valgum w/ ambulation w/o AD w/ attempts at correcting in-toe  Valgum results in hip shift and medial knee collapse  Cued to avoid ambulating w/ emphasis on toe out       Precautions: Born w/ hip dysplasia     Daily Treatment Diary     Manual  2/20 2/23 2/26 2/28 3/6 3/9 3/12 3/15 3/19 3/22    LAD 2x2 min hold onR 2x2 min holds 2x2 mins holds x2 mins on R  x2 mins x2 mins  x2 mins   x2 mins total 2x2 min holds     "the stick" to quad, ITB, HS on R x8 mins total x9 mins  x7 mins   x6 mins   manual piri and HF release x 6 mins total manual piri and HF release x 4 mins total x4 mins total  piri release x 2 mins               Belt lateral distraction x2 min hold              MWM w/ belt for hip ER and flex x10 each                      Exercise Diary  2/20 2/23 2/26 2/28 3/6 3/9 3/12 3/15 3/19 3/22    SLR w/ QS      2x10 on R 2x10 on R        bridging 2/ add squeeze 2x10 3x30 w/ add squeeze 3x30 sec w/ add squeeze 3x10 w/ add squeeze 3x10 w/ add squeeze 3x10 w/ add squeeze 3x10 w/ add squeeze 3x10 w/ add sq 4x10 w/ add sq X10, 2x10 on peanut     Hip add squeeze 3 sec hold x 20  5 sec hold 2x10    x20        Supine single leg fall outs (gentle) YTB 2x10 B   clam shells x 30 s/l clam shells x 30 Gait training focus on proper foot and knee position x 4 mins Gait training focus on proper foot and knee position x 4 mins X 4 mins total  single leg fall outs x 5    Standing mini squats  x25 total x30 total x20 total x30 at bar standing hip ER unweighted x 20 squats at bar x 20 At table x 20, pball squats 3x10 x40 at edge of table x30 total    pball iso hip ext 2x10 B, marching 2x10 B  marching and hip abd 2x10  marching w/ 3 sec ecc fall 3x10 on R only  marching w/ 3 sec ecc fall 3x10 on R only marching and hip ext w/ pball iso and YTB around knees 2x15 each marching, hip abd, hip ext, heel raises 4x10-15 each marching 3x10 focus on ecc    pball isos 3 sec x 10  3 sec x 10  standing hip abd and ext kicks 3x10         pball squats  x30 total through 33%   2x10, focus on glute drive    DLHR/DLTR x 30 total each      Stair training     4" step ups 2x10 on R, min UE support   6" step ups focus on hip drive        Marching               Self HF stretch in standing x30 sec   reviewed  reviewed         Self HS stretching sitting x30 sec   reviewed  reviewed         Stationary bike pre  x7 min  X7 5 min pre x8 min pre x8 min pre, lvl 3 x8 min pre, lvl 3 7 min pre lvl 3  7 min pre lvl 4   7 min lvl 4 5 min lvl 3      Manual HS , cross body glute stretch 3x30 sec holds each Manual HS and hip add stretch 3x30 sec holds on R Manual HS and HF stretch 3x30 sec holds on R Manual HS and HF stretch 3x30 sec holds on R Manual HS and HF stretch 3x30 sec holds on R Manual HS and HF stretch 3x30 sec holds on R Manual HS and HF stretch 3x30 sec holds on R Manual HS and HF [id number] sec holds on R Manual HS and HF [id number] sec holds on R      PROM for R hip flex/ext x5 mins PROM for R hip flex/ext x5 mins PROM for R hip flex/ext x5 mins PROM for R hip flex/ext x5 mins PROM for R hip flex/ext x5 mins  x4 mins x4 mins  x4 mins       Manual hip add stretch 3x30 sec holds Self gastroc stretch 3x30 sec holds B        Add stretch 2x30 sec holds         YTB abd walk 4x50'   Prone ext lifts w/ knee bent 2x10  2x15 4x10          Heel raises x 30 total  YTB abd walking 50'x3 50'x3 total  clams 3x10          Alter-G TM 60% 1 2 x 7 mins     TrA marching 2x10 B          Review of HEP Review of HEP           Modalities                                                         Assessment: Tolerated treatment well  Patient would benefit from continued PT  She shows good tolerance to manual techniques, noting improved mobility post manual treatment  However, this does not translate into improved pain free functional movement  Instructed to avoid ambulating w/ emphasis on toe neutral or out at this time, as this leads to faulty knee and hip mechanics and increased pain  Emphasis functional strength and ROM as able at next session  Plan: Continue per plan of care   MWM progressions, alternating isometrics for add and abd, standing hip abd into pball on wall

## 2018-03-27 ENCOUNTER — OFFICE VISIT (OUTPATIENT)
Dept: PHYSICAL THERAPY | Facility: CLINIC | Age: 53
End: 2018-03-27
Payer: COMMERCIAL

## 2018-03-27 DIAGNOSIS — M25.551 RIGHT HIP PAIN: Primary | ICD-10-CM

## 2018-03-27 PROCEDURE — 97112 NEUROMUSCULAR REEDUCATION: CPT

## 2018-03-27 PROCEDURE — 97140 MANUAL THERAPY 1/> REGIONS: CPT

## 2018-03-27 NOTE — PROGRESS NOTES
Daily Note     Today's date: 3/27/2018  Patient name: Al Mesa  : 1965  MRN: [id number]  Referring provider: Reshma Duval MD  Dx:   Encounter Diagnosis     ICD-10-CM    1  Right hip pain M25 551                   Subjective: Pt notes slight progress in terms of pain free ambulation over the weekend  Meeting w/ surgeon has been moved to   Objective: Pt shows slightly improved pain free gait mechanics post MWM w/ belt for hip IR and flex  Reduced sx and improved ROM following these techniques as well         Precautions: Born w/ hip dysplasia     Daily Treatment Diary     Manual  3/27 2/23 2/26 2/28 3/6 3/9 3/12 3/15 3/19 3/22    LAD x2 mins  2x2 min holds 2x2 mins holds x2 mins on R  x2 mins x2 mins  x2 mins   x2 mins total 2x2 min holds     "the stick" to quad, ITB, HS on R  x9 mins  x7 mins   x6 mins   manual piri and HF release x 6 mins total manual piri and HF release x 4 mins total x4 mins total  piri release x 2 mins      Belt lateral distraction 2x2 min hold         Belt lateral distraction x2 min hold     MWM w/ belt for hip ER and flex 2x10 each         MWM w/ belt for hip ER and flex x10 each                      Exercise Diary  3/27 2/23 2/26 2/28 3/6 3/9 3/12 3/15 3/19 3/22    SLR w/ QS      2x10 on R 2x10 on R        bridging 4x10 w/ ad sq 3x30 w/ add squeeze 3x30 sec w/ add squeeze 3x10 w/ add squeeze 3x10 w/ add squeeze 3x10 w/ add squeeze 3x10 w/ add squeeze 3x10 w/ add sq 4x10 w/ add sq X10, 2x10 on peanut     Hip add squeeze   5 sec hold 2x10    x20        Supine single leg fall outs (gentle)    clam shells x 30 s/l clam shells x 30 Gait training focus on proper foot and knee position x 4 mins Gait training focus on proper foot and knee position x 4 mins X 4 mins total  single leg fall outs x 5    Standing mini squats x30 total x25 total x30 total x20 total x30 at bar standing hip ER unweighted x 20 squats at bar x 20 At table x 20, pball squats 3x10 x40 at edge of table x30 total    pball iso hip ext marching 3x10 focus on ecc  marching and hip abd 2x10  marching w/ 3 sec ecc fall 3x10 on R only  marching w/ 3 sec ecc fall 3x10 on R only marching and hip ext w/ pball iso and YTB around knees 2x15 each marching, hip abd, hip ext, heel raises 4x10-15 each marching 3x10 focus on ecc    pball isos   3 sec x 10  standing hip abd and ext kicks 3x10         pball squats     2x10, focus on glute drive    DLHR/DLTR x 30 total each      Stair training     4" step ups 2x10 on R, min UE support   6" step ups focus on hip drive        Marching               Self HF stretch in standing 2x30 sec   reviewed  reviewed         Self HS stretching sitting    reviewed  reviewed         Stationary bike pre  lvl 5 x 8 mins X7 5 min pre x8 min pre x8 min pre, lvl 3 x8 min pre, lvl 3 7 min pre lvl 3  7 min pre lvl 4   7 min lvl 4 5 min lvl 3     Manual HS and HF [id number] sec holds on R Manual HS , cross body glute stretch 3x30 sec holds each Manual HS and hip add stretch 3x30 sec holds on R Manual HS and HF stretch 3x30 sec holds on R Manual HS and HF stretch 3x30 sec holds on R Manual HS and HF stretch 3x30 sec holds on R Manual HS and HF stretch 3x30 sec holds on R Manual HS and HF stretch 3x30 sec holds on R Manual HS and HF [id number] sec holds on R Manual HS and HF [id number] sec holds on R     x2 mins  PROM for R hip flex/ext x5 mins PROM for R hip flex/ext x5 mins PROM for R hip flex/ext x5 mins PROM for R hip flex/ext x5 mins PROM for R hip flex/ext x5 mins  x4 mins x4 mins  x4 mins       Manual hip add stretch 3x30 sec holds Self gastroc stretch 3x30 sec holds B        Add stretch 2x30 sec holds         YTB abd walk 4x50'   Prone ext lifts w/ knee bent 2x10  2x15 4x10      Clams 3x10    Heel raises x 30 total  YTB abd walking 50'x3 50'x3 total  clams 3x10     YTB hip abd wlaking 50'x4     Alter-G TM 60% 1 2 x 7 mins     TrA marching 2x10 B     YTB hip abd and ext kicks 3x10 Review of HEP Review of HEP           Modalities                                                           Assessment: Tolerated treatment well  Patient demonstrated fatigue post treatment and would benefit from continued PT  Does well w/ banded hip stab, noting fatigue but no increase in pain by end of session  Appropriate for higher level stability challenges as able  Plan is to continue until surgeon meeting w/ strengthening as able  Plan: Continue per plan of care  more tband work

## 2018-03-30 ENCOUNTER — APPOINTMENT (OUTPATIENT)
Dept: PHYSICAL THERAPY | Facility: CLINIC | Age: 53
End: 2018-03-30
Payer: COMMERCIAL

## 2018-04-03 ENCOUNTER — OFFICE VISIT (OUTPATIENT)
Dept: PHYSICAL THERAPY | Facility: CLINIC | Age: 53
End: 2018-04-03
Payer: COMMERCIAL

## 2018-04-03 DIAGNOSIS — M25.551 RIGHT HIP PAIN: Primary | ICD-10-CM

## 2018-04-03 PROCEDURE — 97110 THERAPEUTIC EXERCISES: CPT

## 2018-04-03 PROCEDURE — 97140 MANUAL THERAPY 1/> REGIONS: CPT

## 2018-04-03 NOTE — PROGRESS NOTES
Daily Note     Today's date: 4/3/2018  Patient name: Marilee Lee  : 1965  MRN: [id number]  Referring provider: Lolly Sebastian MD  Dx:   Encounter Diagnosis     ICD-10-CM    1  Right hip pain M25 551                   Subjective: Pt reports that she was very sore after last session  Still sore today, having trouble WB correctly through R hip in neutral position  Leaning more towards hip replacement but will follow up w/ surgeon next week  Objective: Requires cueing for proper glute drive during mini squats but corrects well  Good posture and quick fatigue noted during GTB hip 4 way kicks        Precautions: Born w/ hip dysplasia     Daily Treatment Diary     Manual  3/27 4/3  2/28 3/6 3/9 3/12 3/15 3/19 3/22    LAD x2 mins  x2 mins total  x2 mins on R  x2 mins x2 mins  x2 mins   x2 mins total 2x2 min holds     "the stick" to quad, ITB, HS on R     x6 mins   manual piri and HF release x 6 mins total manual piri and HF release x 4 mins total x4 mins total  piri release x 2 mins      Belt lateral distraction 2x2 min hold x2 mins         Belt lateral distraction x2 min hold     MWM w/ belt for hip ER and flex 2x10 each MWM w/ belt for hip ER and flex 2x10-15 each        MWM w/ belt for hip ER and flex x10 each                      Exercise Diary  3/27 4/3  2/28 3/6 3/9 3/12 3/15 3/19 3/22    SLR w/ QS      2x10 on R 2x10 on R        bridging 4x10 w/ ad sq 4x10 w/ add sq  3x10 w/ add squeeze 3x10 w/ add squeeze 3x10 w/ add squeeze 3x10 w/ add squeeze 3x10 w/ add sq 4x10 w/ add sq X10, 2x10 on peanut     Hip add squeeze      x20        Supine single leg fall outs (gentle)    clam shells x 30 s/l clam shells x 30 Gait training focus on proper foot and knee position x 4 mins Gait training focus on proper foot and knee position x 4 mins X 4 mins total  single leg fall outs x 5    Standing mini squats x30 total x30 total at EOB  x20 total x30 at bar standing hip ER unweighted x 20 squats at bar x 20 At table x 20, pball squats 3x10 x40 at edge of table x30 total    pball iso hip ext marching 3x10 focus on ecc marching 3x10 focus on ecc   marching w/ 3 sec ecc fall 3x10 on R only  marching w/ 3 sec ecc fall 3x10 on R only marching and hip ext w/ pball iso and YTB around knees 2x15 each marching, hip abd, hip ext, heel raises 4x10-15 each marching 3x10 focus on ecc    pball isos     standing hip abd and ext kicks 3x10         pball squats     2x10, focus on glute drive    DLHR/DLTR x 30 total each      Stair training     4" step ups 2x10 on R, min UE support   6" step ups focus on hip drive        Marching               Self HF stretch in standing 2x30 sec 2x30 sec  reviewed  reviewed         Self HS stretching sitting    reviewed  reviewed         Stationary bike pre  lvl 5 x 8 mins lvl 5 x 8 mins  x8 min pre, lvl 3 x8 min pre, lvl 3 7 min pre lvl 3  7 min pre lvl 4   7 min lvl 4 5 min lvl 3     Manual HS and HF [id number] sec holds on R Manual HS and HF [id number] sec holds on R  Manual HS and HF stretch 3x30 sec holds on R Manual HS and HF stretch 3x30 sec holds on R Manual HS and HF stretch 3x30 sec holds on R Manual HS and HF stretch 3x30 sec holds on R Manual HS and HF stretch 3x30 sec holds on R Manual HS and HF [id number] sec holds on R Manual HS and HF [id number] sec holds on R     x2 mins    PROM for R hip flex/ext x5 mins PROM for R hip flex/ext x5 mins PROM for R hip flex/ext x5 mins  x4 mins x4 mins  x4 mins               Add stretch 2x30 sec holds       GTB hip 4 way kicks 3x10 each  YTB abd walk 4x50'   Prone ext lifts w/ knee bent 2x10  2x15 4x10      Clams 3x10    Heel raises x 30 total  YTB abd walking 50'x3 50'x3 total  clams 3x10     YTB hip abd wlaking 50'x4     Alter-G TM 60% 1 2 x 7 mins     TrA marching 2x10 B     YTB hip abd and ext kicks 3x10     Review of HEP Review of HEP           Modalities                                                           Assessment: Tolerated treatment well  Patient demonstrated fatigue post treatment and would benefit from continued PT  Pt had to cancel next appt but will be in next week  Plan on Re-assessment if able to prepare for surgeon follow up at end of next week  Plan: Continue per plan of care

## 2018-04-06 ENCOUNTER — APPOINTMENT (OUTPATIENT)
Dept: PHYSICAL THERAPY | Facility: CLINIC | Age: 53
End: 2018-04-06
Payer: COMMERCIAL

## 2018-04-09 ENCOUNTER — OFFICE VISIT (OUTPATIENT)
Dept: PHYSICAL THERAPY | Facility: CLINIC | Age: 53
End: 2018-04-09
Payer: COMMERCIAL

## 2018-04-09 DIAGNOSIS — M25.551 RIGHT HIP PAIN: Primary | ICD-10-CM

## 2018-04-09 PROCEDURE — G8978 MOBILITY CURRENT STATUS: HCPCS

## 2018-04-09 PROCEDURE — G8979 MOBILITY GOAL STATUS: HCPCS

## 2018-04-09 PROCEDURE — 97110 THERAPEUTIC EXERCISES: CPT

## 2018-04-09 NOTE — LETTER
2018    Irvin Motta MD  [address]  [address]  [address] [place]    Patient: Claudio Sanchez   YOB: 1965   Date of Visit: 2018     Encounter Diagnosis     ICD-10-CM    1  Right hip pain M25 [id number]        Dear Dr Branden Michel:    Please review the attached Plan of Care from Claudio Sanchez recent visit  Please verify that you agree therapy should continue by signing the attached document and sending it back to our office  If you have any questions or concerns, please don't hesitate to call  Sincerely,    Geraldine Wheat, PT      Referring Provider:      I certify that I have read the below Plan of Care and certify the need for these services furnished under this plan of treatment while under my care  Irvin Motta MD  [address]  [facility] [address]: [phone]          PT Re-Evaluation     Today's date: 2018  Patient name: Claudio Sanchez  : 1965  MRN: [id number]  Referring provider: Danie Bean MD  Dx:   Encounter Diagnosis   Name Primary?  Right hip pain Yes                  Assessment    Assessment details: Pt is a pleasant 50 y o  female who presents to [facility] PT s/p R hip arthroscopic repair for meniscus on 2018  Today, she presents with decreased and painful R hip ROM, R hip and core strength/stability, high self reports of pain w/ activity, and decreased tolerance to all activities  She is not yet driving on own  Functionally, she is limited in her ability to stand and ambulate, perform functional transfers, negotiate stairs, sleep on R side, and perform normal recreational activities  She is limited by low back pain at this time as well  Pt will benefit from skilled PT to address the aforementioned deficits and limitations in an effort to maximize pain free functional mobility and overall quality of life  Progress as able with these goals in mind  RE 3/9/2018: Pt has been re-evaluated following 7 total skilled therapy visits  She has made good progress to date  Pain reports are higher and ROM measurements are slightly lower, however she has made good progress in terms of strength and functional mobility  Pain has decreased slightly over the last week, she is better ambulate today w/o AD as compared to previous sessions  She still stands to gain in terms of R hip ROM and joint mobility, B LE and core strength/stability, tolerance to functional activities, and quality of functional movements  She remains very motivated to improve and will benefit from continued skilled services  RE 4/9/2018: Pt has been re-evaluated following 14 total skilled therapy sessions  Over the last month she has made progress in terms of ROM and strength, but pain has increased to the point where she is unable to perform any functional activity w/o UE support or AD   She will be re-evaluated by surgeon later this week and is strongly considering a hip replacement, pending surgeon's examination and opinion  She has been diligent w/ HEP and remains very motivated to improve, but is understandably discouraged by the fact that pain has not resolved at all  She follows up Friday, 4/13 w/ me and will will discuss results on MD visits and whether or not we will continue w/ skilled PT   Understanding of Dx/Px/POC: good   Prognosis: good    Goals  Short term goals (3 weeks):  1) Pt will improve R hip ROM deficits by at least 10 degrees each pain free - progressed   2) Pt will improve R hip and core strength deficits by 1/3 grade MMT  - progressed  3)  Pt will minimize soft tissue density restrictions through R anterior/lateral/posterior hip   - progressed  4) Pt will initiate and progress HEP w/ special emphasis on pain free functional strength and ROM - achieved     Long term goals (6 weeks)  1) Pt will improve FOTO to at least 70  - no progress  2) Pt will stand and ambulate for durations greater than 30 min w/ normalized gait mechanics and pain <3/10 throughout  - progressed  3) Pt will be functionally unlimited w/ stair climbing  - no progress  4) Pt will be independent and compliant w/ HEP in order to maximize functional benefit of skilled PT following d/c  - progressed       Plan  Patient would benefit from: skilled PT  Planned modality interventions: cryotherapy and thermotherapy: hydrocollator packs  Planned therapy interventions: activity modification, abdominal trunk stabilization, manual therapy, massage, transfer training, therapeutic training, therapeutic exercise, therapeutic activities, stretching, strengthening, patient education, neuromuscular re-education, home exercise program, graded exercise, graded activity, gait training, functional ROM exercises, flexibility, body mechanics training and balance  Frequency: 2x week  Duration in visits: 8  Duration in weeks: 4  Treatment plan discussed with: patient  Plan details: POC will progress to include more aggressive ROM and strengthening as tolerated, higher level functional movement training         Subjective Evaluation    History of Present Illness  Date of surgery: 1/30/2018  Mechanism of injury: Pt had arthroscopic surgery on 1/30/2018 to repair torn labrum  Was seen previously for crutch training session prior to surgery  Born with congenital hip dysplasia and notes that this caused pain for many years  Pt initially felt really good following surgery, has been more sore over the last week  Notes weakness in R hip that causes difficulty w/ movement, especially walking  Pain only comes when she tires and her mechanics change  Excited to get into formal therapy  Functional status is as follows:     RE 3/9/2018: Pt feels that she is improving in her ability to ambulate, especially over the last week  Pt notes that pain on average is worse but she feels stronger and more mobile   Pain increased the week following surgery, feels that initial pain values were low as she felt great then  Pt feels she is about 60% better  She will follow up w/ MD next week  Updated functional status to follow:     RE 2018: Pt notes little progress over the last month  She follows up w/ surgeon this Thursday,   Leaning towards getting hip replaced if this is recommended as she has no relief from anything right now  Frustrated by lack of progress but hopeful that MD visit will help shed light on what is going on  Updated functional status to follow:   Quality of life: good    Pain  Current pain ratin  At best pain ratin  At worst pain rating: 10  Location: R lateral hip  Quality: throbbing, sharp and burning  Relieving factors: medications, rest and change in position  Aggravating factors: standing, walking and stair climbing  Progression: improved    Social Support  Steps to enter house: yes  Stairs in house: yes   Lives in: multiple-level home  Lives with: spouse    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, works in school system )    Diagnostic Tests  No diagnostic tests performed  Patient Goals  Patient goals for therapy: increased strength, decreased pain, increased motion, return to sport/leisure activities and return to work  Patient goal: I would like to walk without being afraid of falling, I want to get my socks on without help  Objective     Palpation     Additional Palpation Details  Mod increased tissue density through R piri and glutes, min through R HF, mod TTP through R anterior and lateral hip      All continued at RE 2018    Neurological Testing     Sensation     Hip   Left Hip   Intact: light touch    Right Hip   Intact: light touch    Active Range of Motion   Left Hip   Normal active range of motion    Right Hip   Flexion: 109 degrees   Abduction: 28 degrees   External rotation (90/90): 40 degrees   Internal rotation (90/90): 20 degrees     Additional Active Range of Motion Details  L hip ROM limited by low back pain     Passive Range of Motion   Left Hip   Normal passive range of motion    Right Hip   Flexion: 115 degrees   Abduction: 38 degrees     Strength/Myotome Testing     Left Hip   Normal muscle strength    Right Hip   Planes of Motion   Flexion: 4  Extension: 4-  Abduction: 4-  Adduction: WFL  External rotation: 4-  Internal rotation: 4    Isolated Muscles   Gluteus medius: 4-    Additional Strength Details  Core: no greater than 2/5 at this time, limited by low back discomfort  Further progressions held at RE s/t hip discomfort  RE 4/9/2018: same as at previous RE    Ambulation     Ambulation: Level Surfaces     Additional Level Surfaces Ambulation Details  Mod antalgic, R toe in, R sided trunk lean, R sided Trendelenburg, decreased step length and speed on R  Pt able to ambulate w/o crutches but prefers crutches     RE 4/9/2018: Little change, cannot ambulate w/ legs in neutral positioning w/o crutches    Functional Assessment     Comments  Sit<>stand: decreased WB through R Le, min UE support, min antalgic, tires quickly   (continued at RE)        Precautions: Born w/ hip dysplasia     Daily Treatment Diary     Manual  3/27 4/3 4/9  3/6 3/9 3/12 3/15 3/19 3/22    LAD x2 mins  x2 mins total x2 mins   x2 mins x2 mins  x2 mins   x2 mins total 2x2 min holds     "the stick" to quad, ITB, HS on R     x6 mins   manual piri and HF release x 6 mins total manual piri and HF release x 4 mins total x4 mins total  piri release x 2 mins      Belt lateral distraction 2x2 min hold x2 mins         Belt lateral distraction x2 min hold     MWM w/ belt for hip ER and flex 2x10 each MWM w/ belt for hip ER and flex 2x10-15 each        MWM w/ belt for hip ER and flex x10 each                      Exercise Diary  3/27 4/3 4/9   3/6 3/9 3/12 3/15 3/19 3/22    SLR w/ QS      2x10 on R 2x10 on R        bridging 4x10 w/ ad sq 4x10 w/ add sq 4x10 w/ add sq  3x10 w/ add squeeze 3x10 w/ add squeeze 3x10 w/ add squeeze 3x10 w/ add sq 4x10 w/ add sq X10, 2x10 on peanut     Hip add squeeze      x20        Supine single leg fall outs (gentle)     s/l clam shells x 30 Gait training focus on proper foot and knee position x 4 mins Gait training focus on proper foot and knee position x 4 mins X 4 mins total  single leg fall outs x 5    Standing mini squats x30 total x30 total at EOB x30 total  x30 at bar standing hip ER unweighted x 20 squats at bar x 20 At table x 20, pball squats 3x10 x40 at edge of table x30 total    pball iso hip ext marching 3x10 focus on ecc marching 3x10 focus on ecc   marching w/ 3 sec ecc fall 3x10 on R only  marching w/ 3 sec ecc fall 3x10 on R only marching and hip ext w/ pball iso and YTB around knees 2x15 each marching, hip abd, hip ext, heel raises 4x10-15 each marching 3x10 focus on ecc    pball isos     standing hip abd and ext kicks 3x10         pball squats         DLHR/DLTR x 30 total each      Stair training        6" step ups focus on hip drive        Marching               Self HF stretch in standing 2x30 sec 2x30 sec   reviewed         Self HS stretching sitting   PROM for R hip x 5 mins  reviewed         Stationary bike pre  lvl 5 x 8 mins lvl 5 x 8 mins lvl 5 x 8 mins  x8 min pre, lvl 3 7 min pre lvl 3  7 min pre lvl 4   7 min lvl 4 5 min lvl 3     Manual HS and HF [id number] sec holds on R Manual HS and HF [id number] sec holds on R Manual HS and HF [id number] sec holds on R  Manual HS and HF stretch 3x30 sec holds on R Manual HS and HF stretch 3x30 sec holds on R Manual HS and HF stretch 3x30 sec holds on R Manual HS and HF stretch 3x30 sec holds on R Manual HS and HF [id number] sec holds on R Manual HS and HF [id number] sec holds on R     x2 mins     PROM for R hip flex/ext x5 mins PROM for R hip flex/ext x5 mins  x4 mins x4 mins  x4 mins               Add stretch 2x30 sec holds       GTB hip 4 way kicks 3x10 each     Prone ext lifts w/ knee bent 2x10  2x15 4x10      Clams 3x10    Heel raises x 30 total  YTB abd walking 50'x3 50'x3 total  clams 3x10     YTB hip abd wlaking 50'x4     Alter-G TM 60% 1 2 x 7 mins     TrA marching 2x10 B     YTB hip abd and ext kicks 3x10     Review of HEP Review of HEP           Modalities

## 2018-04-09 NOTE — PROGRESS NOTES
PT Re-Evaluation     Today's date: 2018  Patient name: Clover Leyden  : 1965  MRN: [id number]  Referring provider: Osbaldo Allison MD  Dx:   Encounter Diagnosis   Name Primary?  Right hip pain Yes                  Assessment    Assessment details: Pt is a pleasant 50 y o  female who presents to [place] [id number] PT s/p R hip arthroscopic repair for meniscus on 2018  Today, she presents with decreased and painful R hip ROM, R hip and core strength/stability, high self reports of pain w/ activity, and decreased tolerance to all activities  She is not yet driving on own  Functionally, she is limited in her ability to stand and ambulate, perform functional transfers, negotiate stairs, sleep on R side, and perform normal recreational activities  She is limited by low back pain at this time as well  Pt will benefit from skilled PT to address the aforementioned deficits and limitations in an effort to maximize pain free functional mobility and overall quality of life  Progress as able with these goals in mind  RE 3/9/2018: Pt has been re-evaluated following 7 total skilled therapy visits  She has made good progress to date  Pain reports are higher and ROM measurements are slightly lower, however she has made good progress in terms of strength and functional mobility  Pain has decreased slightly over the last week, she is better ambulate today w/o AD as compared to previous sessions  She still stands to gain in terms of R hip ROM and joint mobility, B LE and core strength/stability, tolerance to functional activities, and quality of functional movements  She remains very motivated to improve and will benefit from continued skilled services  RE 2018: Pt has been re-evaluated following 14 total skilled therapy sessions   Over the last month she has made progress in terms of ROM and strength, but pain has increased to the point where she is unable to perform any functional activity w/o UE support or AD   She will be re-evaluated by surgeon later this week and is strongly considering a hip replacement, pending surgeon's examination and opinion  She has been diligent w/ HEP and remains very motivated to improve, but is understandably discouraged by the fact that pain has not resolved at all  She follows up Friday, 4/13 w/ me and will will discuss results on MD visits and whether or not we will continue w/ skilled PT   Understanding of Dx/Px/POC: good   Prognosis: good    Goals  Short term goals (3 weeks):  1) Pt will improve R hip ROM deficits by at least 10 degrees each pain free - progressed   2) Pt will improve R hip and core strength deficits by 1/3 grade MMT  - progressed  3)  Pt will minimize soft tissue density restrictions through R anterior/lateral/posterior hip  - progressed  4) Pt will initiate and progress HEP w/ special emphasis on pain free functional strength and ROM - achieved     Long term goals (6 weeks)  1) Pt will improve FOTO to at least 70  - no progress  2) Pt will stand and ambulate for durations greater than 30 min w/ normalized gait mechanics and pain <3/10 throughout   - progressed  3) Pt will be functionally unlimited w/ stair climbing  - no progress  4) Pt will be independent and compliant w/ HEP in order to maximize functional benefit of skilled PT following d/c  - progressed       Plan  Patient would benefit from: skilled PT  Planned modality interventions: cryotherapy and thermotherapy: hydrocollator packs  Planned therapy interventions: activity modification, abdominal trunk stabilization, manual therapy, massage, transfer training, therapeutic training, therapeutic exercise, therapeutic activities, stretching, strengthening, patient education, neuromuscular re-education, home exercise program, graded exercise, graded activity, gait training, functional ROM exercises, flexibility, body mechanics training and balance  Frequency: 2x week  Duration in visits: 8  Duration in weeks: 4  Treatment plan discussed with: patient  Plan details: POC will progress to include more aggressive ROM and strengthening as tolerated, higher level functional movement training         Subjective Evaluation    History of Present Illness  Date of surgery: 2018  Mechanism of injury: Pt had arthroscopic surgery on 2018 to repair torn labrum  Was seen previously for crutch training session prior to surgery  Born with congenital hip dysplasia and notes that this caused pain for many years  Pt initially felt really good following surgery, has been more sore over the last week  Notes weakness in R hip that causes difficulty w/ movement, especially walking  Pain only comes when she tires and her mechanics change  Excited to get into formal therapy  Functional status is as follows:     RE 3/9/2018: Pt feels that she is improving in her ability to ambulate, especially over the last week  Pt notes that pain on average is worse but she feels stronger and more mobile  Pain increased the week following surgery, feels that initial pain values were low as she felt great then  Pt feels she is about 60% better  She will follow up w/ MD next week  Updated functional status to follow:     RE 2018: Pt notes little progress over the last month  She follows up w/ surgeon this Thursday,   Leaning towards getting hip replaced if this is recommended as she has no relief from anything right now  Frustrated by lack of progress but hopeful that MD visit will help shed light on what is going on   Updated functional status to follow:   Quality of life: good    Pain  Current pain ratin  At best pain ratin  At worst pain rating: 10  Location: R lateral hip  Quality: throbbing, sharp and burning  Relieving factors: medications, rest and change in position  Aggravating factors: standing, walking and stair climbing  Progression: improved    Social Support  Steps to enter house: yes  Stairs in house: yes   Lives in: multiple-level home  Lives with: spouse    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, works in school system )    Diagnostic Tests  No diagnostic tests performed  Patient Goals  Patient goals for therapy: increased strength, decreased pain, increased motion, return to sport/leisure activities and return to work  Patient goal: I would like to walk without being afraid of falling, I want to get my socks on without help  Objective     Palpation     Additional Palpation Details  Mod increased tissue density through R piri and glutes, min through R HF, mod TTP through R anterior and lateral hip  All continued at RE 4/9/2018    Neurological Testing     Sensation     Hip   Left Hip   Intact: light touch    Right Hip   Intact: light touch    Active Range of Motion   Left Hip   Normal active range of motion    Right Hip   Flexion: 109 degrees   Abduction: 28 degrees   External rotation (90/90): 40 degrees   Internal rotation (90/90): 20 degrees     Additional Active Range of Motion Details  L hip ROM limited by low back pain     Passive Range of Motion   Left Hip   Normal passive range of motion    Right Hip   Flexion: 115 degrees   Abduction: 38 degrees     Strength/Myotome Testing     Left Hip   Normal muscle strength    Right Hip   Planes of Motion   Flexion: 4  Extension: 4-  Abduction: 4-  Adduction: WFL  External rotation: 4-  Internal rotation: 4    Isolated Muscles   Gluteus medius: 4-    Additional Strength Details  Core: no greater than 2/5 at this time, limited by low back discomfort  Further progressions held at RE s/t hip discomfort  RE 4/9/2018: same as at previous RE    Ambulation     Ambulation: Level Surfaces     Additional Level Surfaces Ambulation Details  Mod antalgic, R toe in, R sided trunk lean, R sided Trendelenburg, decreased step length and speed on R   Pt able to ambulate w/o crutches but prefers crutches     RE 4/9/2018: Little change, cannot ambulate w/ legs in neutral positioning w/o crutches    Functional Assessment     Comments  Sit<>stand: decreased WB through R Le, min UE support, min antalgic, tires quickly   (continued at RE)        Precautions: Born w/ hip dysplasia     Daily Treatment Diary     Manual  3/27 4/3 4/9  3/6 3/9 3/12 3/15 3/19 3/22    LAD x2 mins  x2 mins total x2 mins   x2 mins x2 mins  x2 mins   x2 mins total 2x2 min holds     "the stick" to quad, ITB, HS on R     x6 mins   manual piri and HF release x 6 mins total manual piri and HF release x 4 mins total x4 mins total  piri release x 2 mins      Belt lateral distraction 2x2 min hold x2 mins         Belt lateral distraction x2 min hold     MWM w/ belt for hip ER and flex 2x10 each MWM w/ belt for hip ER and flex 2x10-15 each        MWM w/ belt for hip ER and flex x10 each                      Exercise Diary  3/27 4/3 4/9   3/6 3/9 3/12 3/15 3/19 3/22    SLR w/ QS      2x10 on R 2x10 on R        bridging 4x10 w/ ad sq 4x10 w/ add sq 4x10 w/ add sq  3x10 w/ add squeeze 3x10 w/ add squeeze 3x10 w/ add squeeze 3x10 w/ add sq 4x10 w/ add sq X10, 2x10 on peanut     Hip add squeeze      x20        Supine single leg fall outs (gentle)     s/l clam shells x 30 Gait training focus on proper foot and knee position x 4 mins Gait training focus on proper foot and knee position x 4 mins X 4 mins total  single leg fall outs x 5    Standing mini squats x30 total x30 total at EOB x30 total  x30 at bar standing hip ER unweighted x 20 squats at bar x 20 At table x 20, pball squats 3x10 x40 at edge of table x30 total    pball iso hip ext marching 3x10 focus on ecc marching 3x10 focus on ecc   marching w/ 3 sec ecc fall 3x10 on R only  marching w/ 3 sec ecc fall 3x10 on R only marching and hip ext w/ pball iso and YTB around knees 2x15 each marching, hip abd, hip ext, heel raises 4x10-15 each marching 3x10 focus on ecc    pball isos     standing hip abd and ext kicks 3x10         pball squats         DLHR/DLTR x 30 total each Stair training        6" step ups focus on hip drive        Marching               Self HF stretch in standing 2x30 sec 2x30 sec   reviewed         Self HS stretching sitting   PROM for R hip x 5 mins  reviewed         Stationary bike pre  lvl 5 x 8 mins lvl 5 x 8 mins lvl 5 x 8 mins  x8 min pre, lvl 3 7 min pre lvl 3  7 min pre lvl 4   7 min lvl 4 5 min lvl 3     Manual HS and HF [id number] sec holds on R Manual HS and HF [id number] sec holds on R Manual HS and HF [id number] sec holds on R  Manual HS and HF stretch 3x30 sec holds on R Manual HS and HF stretch 3x30 sec holds on R Manual HS and HF stretch 3x30 sec holds on R Manual HS and HF stretch 3x30 sec holds on R Manual HS and HF [id number] sec holds on R Manual HS and HF [id number] sec holds on R     x2 mins     PROM for R hip flex/ext x5 mins PROM for R hip flex/ext x5 mins  x4 mins x4 mins  x4 mins               Add stretch 2x30 sec holds       GTB hip 4 way kicks 3x10 each     Prone ext lifts w/ knee bent 2x10  2x15 4x10      Clams 3x10    Heel raises x 30 total  YTB abd walking 50'x3 50'x3 total  clams 3x10     YTB hip abd wlaking 50'x4     Alter-G TM 60% 1 2 x 7 mins     TrA marching 2x10 B     YTB hip abd and ext kicks 3x10     Review of HEP Review of HEP           Modalities

## 2018-04-12 ENCOUNTER — APPOINTMENT (OUTPATIENT)
Dept: PHYSICAL THERAPY | Facility: CLINIC | Age: 53
End: 2018-04-12
Payer: COMMERCIAL

## 2018-04-13 ENCOUNTER — OFFICE VISIT (OUTPATIENT)
Dept: PHYSICAL THERAPY | Facility: CLINIC | Age: 53
End: 2018-04-13
Payer: COMMERCIAL

## 2018-04-13 DIAGNOSIS — M25.551 RIGHT HIP PAIN: Primary | ICD-10-CM

## 2018-04-13 PROCEDURE — G8980 MOBILITY D/C STATUS: HCPCS

## 2018-04-13 PROCEDURE — G8979 MOBILITY GOAL STATUS: HCPCS

## 2018-04-13 PROCEDURE — 97110 THERAPEUTIC EXERCISES: CPT

## 2018-04-13 NOTE — PROGRESS NOTES
Physical Therapy Discharge Report     Patient Name: Jim Diamond  [id number] Date: 4/13/2018  Dx:   Encounter Diagnosis   Name Primary?  Right hip pain Yes        There is no problem list on file for this patient  No past medical history on file  No past surgical history on file  Assessment:  Most recent visit:  Reviewed HEP, answered questions relating to [facility]  rehab, gave contact information  Exercises performed: HEP reviewed and understood     Goals and Progress:  Short term goals (3 weeks):  1) Pt will improve R hip ROM deficits by at least 10 degrees each pain free - progressed   2) Pt will improve R hip and core strength deficits by 1/3 grade MMT  - progressed  3)  Pt will minimize soft tissue density restrictions through R anterior/lateral/posterior hip  - progressed  4) Pt will initiate and progress HEP w/ special emphasis on pain free functional strength and ROM - achieved     Long term goals (6 weeks)  1) Pt will improve FOTO to at least 70  - no progress  2) Pt will stand and ambulate for durations greater than 30 min w/ normalized gait mechanics and pain <3/10 throughout  - progressed  3) Pt will be functionally unlimited w/ stair climbing  - no progress  4) Pt will be independent and compliant w/ HEP in order to maximize functional benefit of skilled PT following d/c  - progressed     Plan:  Plan: d/c to HEP     Additional Discharge Considerations:  Pt given contact information and asked to call or email w/ future issues  Expect to hear or see pt following ROSAMARIA in the coming months  Subjective:  Patient's response to therapy: Pt saw surgeon yesterday, 4/12, and was informed that she will need ROSAMARIA  Scheduled for 8/7/2018 as of now but is on cancellation wait list  Will continue w/ HEP on own but does not need to continue w/ strengthening in skilled PT at this time        Objective:  HEP reviewed and understood, pt safe to perform all techniques      Precautions: Born w/ hip dysplasia Daily Treatment Diary     Manual  3/27 4/3 4/9 4/13  3/9 3/12 3/15 3/19 3/22    LAD x2 mins  x2 mins total x2 mins    x2 mins  x2 mins   x2 mins total 2x2 min holds     "the stick" to quad, ITB, HS on R       manual piri and HF release x 6 mins total manual piri and HF release x 4 mins total x4 mins total  piri release x 2 mins      Belt lateral distraction 2x2 min hold x2 mins         Belt lateral distraction x2 min hold     MWM w/ belt for hip ER and flex 2x10 each MWM w/ belt for hip ER and flex 2x10-15 each        MWM w/ belt for hip ER and flex x10 each                      Exercise Diary  3/27 4/3 4/9  4/13  3/9 3/12 3/15 3/19 3/22    SLR w/ QS      2x10 on R 2x10 on R        bridging 4x10 w/ ad sq 4x10 w/ add sq 4x10 w/ add sq   3x10 w/ add squeeze 3x10 w/ add squeeze 3x10 w/ add sq 4x10 w/ add sq X10, 2x10 on peanut     Hip add squeeze      x20        Supine single leg fall outs (gentle)      Gait training focus on proper foot and knee position x 4 mins Gait training focus on proper foot and knee position x 4 mins X 4 mins total  single leg fall outs x 5    Standing mini squats x30 total x30 total at EOB x30 total   standing hip ER unweighted x 20 squats at bar x 20 At table x 20, pball squats 3x10 x40 at edge of table x30 total    pball iso hip ext marching 3x10 focus on ecc marching 3x10 focus on ecc     marching w/ 3 sec ecc fall 3x10 on R only marching and hip ext w/ pball iso and YTB around knees 2x15 each marching, hip abd, hip ext, heel raises 4x10-15 each marching 3x10 focus on ecc    pball isos              pball squats         DLHR/DLTR x 30 total each      Stair training        6" step ups focus on hip drive        Marching               Self HF stretch in standing 2x30 sec 2x30 sec            Self HS stretching sitting   PROM for R hip x 5 mins           Stationary bike pre  lvl 5 x 8 mins lvl 5 x 8 mins lvl 5 x 8 mins   7 min pre lvl 3  7 min pre lvl 4   7 min lvl 4 5 min lvl 3     Manual HS and HF [id number] sec holds on R Manual HS and HF [id number] sec holds on R Manual HS and HF [id number] sec holds on R   Manual HS and HF stretch 3x30 sec holds on R Manual HS and HF stretch 3x30 sec holds on R Manual HS and HF stretch 3x30 sec holds on R Manual HS and HF [id number] sec holds on R Manual HS and HF [id number] sec holds on R     x2 mins      PROM for R hip flex/ext x5 mins  x4 mins x4 mins  x4 mins               Add stretch 2x30 sec holds       GTB hip 4 way kicks 3x10 each     Prone ext lifts w/ knee bent 2x10  2x15 4x10      Clams 3x10      YTB abd walking 50'x3 50'x3 total  clams 3x10     YTB hip abd wlaking 50'x4     Alter-G TM 60% 1 2 x 7 mins     TrA marching 2x10 B     YTB hip abd and ext kicks 3x10   Comprehensive review of HEP  Review of HEP Review of HEP           Modalities                                                         Assessment: Pt made as much progress as can be expected w/ her condition  She will undergo ROSAMARIA sometime in the next 2-4 months, plan on seeing her back following this  She was given updated HEP, [place] for home, and contact information for future questions  D/C to HEP today      Plan: D/C to HEP

## 2018-04-16 ENCOUNTER — APPOINTMENT (OUTPATIENT)
Dept: PHYSICAL THERAPY | Facility: CLINIC | Age: 53
End: 2018-04-16
Payer: COMMERCIAL

## 2018-04-19 ENCOUNTER — APPOINTMENT (OUTPATIENT)
Dept: PHYSICAL THERAPY | Facility: CLINIC | Age: 53
End: 2018-04-19
Payer: COMMERCIAL

## 2018-05-15 ENCOUNTER — TRANSCRIBE ORDERS (OUTPATIENT)
Dept: PHYSICAL THERAPY | Facility: CLINIC | Age: 53
End: 2018-05-15

## 2018-06-12 ENCOUNTER — EVALUATION (OUTPATIENT)
Dept: PHYSICAL THERAPY | Facility: CLINIC | Age: 53
End: 2018-06-12
Payer: COMMERCIAL

## 2018-06-12 DIAGNOSIS — M25.551 PAIN IN RIGHT HIP: Primary | ICD-10-CM

## 2018-06-12 PROCEDURE — G8978 MOBILITY CURRENT STATUS: HCPCS

## 2018-06-12 PROCEDURE — G8979 MOBILITY GOAL STATUS: HCPCS

## 2018-06-12 PROCEDURE — 97161 PT EVAL LOW COMPLEX 20 MIN: CPT

## 2018-06-12 NOTE — LETTER
2018    Odilia Maldonado MD  [address] [place] [address] [place] [phone]    Patient: Mack Jackson   YOB: 1965   Date of Visit: 2018     Encounter Diagnosis     ICD-10-CM    1  Pain in right hip M25 551        Dear Dr Cedric Moise:    Please review the attached Plan of Care from Mack [facility] recent visit  Please verify that you agree therapy should continue by signing the attached document and sending it back to our office  If you have any questions or concerns, please don't hesitate to call  Sincerely,    Nasim Villarreal, PT      Referring Provider:      I certify that I have read the below Plan of Care and certify the need for these services furnished under this plan of treatment while under my care  Odilia Maldonado MD  [address] [place] [address] Street: [phone]          PT Evaluation     Today's date: 2018  Patient name: Mack Jackson  : 1965  MRN: [id number]  Referring provider: Abby Gustafson MD  Dx:   Encounter Diagnosis     ICD-10-CM    1  Pain in right hip M25 551                   Assessment  Impairments: abnormal gait, abnormal muscle firing, abnormal muscle tone, abnormal or restricted ROM, abnormal movement, activity intolerance, impaired physical strength, lacks appropriate home exercise program and pain with function    Assessment details: Pt is a pleasant 48 y o  female who presents to [facility] PT s/p R ROSAMARIA performed on 2018  Today, she presents with decreased R hip ROM and strength, soft tissue density restrictions, high self reports of pain, and decreased tolerance to functional activity  Functionally, she is limited in her ability to stand and ambulate, perform functional transfers including bed mobility, negotiate stairs, and perform normal recreational and household activities  She is extremely motivated to improve   Pt will benefit from skilled PT to address the aforementioned deficits and limitations in an effort to maximize pain free functional mobility and overall quality of life  Progress as able with these goals in mind  Understanding of Dx/Px/POC: good   Prognosis: good    Goals  Short term goals (3 weeks):  1) Pt will improve R hip ROM for flexion, abduction, and IR/ER to expected limits per restrictions w/o pain  2) Pt will improve R hip MMT testing by at least 1/3 grade  3) Pt will report pain at worse <5/10  4) Pt will initiate and progress HEP w/ special emphasis on functional ROM and strength  Long term goals (6 weeks)  1) Pt will improve FOTO to at least 65  2) Pt will stand and ambulate for durations greater than 30 min w/ normalized gait mechanics and no pain  3) Pt will be functionally unlimited w/ stairs in order to reach second floor of home w/o issue    4) Pt will be independent and compliant w/ HEP in order to maximize functional benefit of skilled PT following d/c        Plan  Patient would benefit from: skilled PT  Planned modality interventions: cryotherapy and thermotherapy: hydrocollator packs  Planned therapy interventions: abdominal trunk stabilization, activity modification, joint mobilization, manual therapy, massage, motor coordination training, neuromuscular re-education, patient education, postural training, stretching, strengthening, therapeutic activities, therapeutic exercise, therapeutic training, transfer training, home exercise program, gait training, graded activity, functional ROM exercises, graded exercise, flexibility, body mechanics training, balance and balance/weight bearing training  Frequency: 2x week  Duration in visits: 12  Duration in weeks: 6  Treatment plan discussed with: patient  Plan details: POC to include: bike, stretching, DTM, hip and core stab, standing functional movement training     HEP to start: HEP from hospital reviewed           Subjective Evaluation    History of Present Illness  Date of surgery: 2018  Mechanism of injury: Pt returns to skilled PT after undergoing elective ROSAMARIA on 2018  She had previously been seen following labral surgery that failed to improve pain  Chrisandra Olszewski will be removed this Friday, 6/15  Sleeping is difficult at night s/t pain  Normally a side sleeper but is unable to at this time  Pt was given oxycodone for pain, taking a quarter of one pill every 4-6 hours  Does offer some relief before making her very groggy  Pt is hopeful that she will be able to move around pain free following rehab  Pt is very motivated to improve  Functional status is as follows:   Quality of life: good    Pain  Current pain ratin  At best pain ratin  At worst pain ratin  Location: R posterior and lateral hip  Quality: sharp, dull ache, burning and pulling  Relieving factors: ice, medications and rest  Aggravating factors: sitting, standing, walking and stair climbing  Progression: improved    Social Support  Steps to enter house: yes  Stairs in house: yes   Lives in: multiple-level home  Lives with: spouse and adult children (college aged child)    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)  Treatments  Previous treatment: physical therapy  Patient Goals  Patient goals for therapy: increased strength, return to sport/leisure activities, decreased pain and increased motion  Patient goal: increase stability, flexilbility         Objective     Palpation     Additional Palpation Details  TTP through lateral and posterior hip on R, along site of incision, mod increase in tissue density in these areas as well       Neurological Testing     Sensation     Hip   Left Hip   Intact: light touch    Right Hip   Intact: light touch    Active Range of Motion   Left Hip   Normal active range of motion    Right Hip   Flexion: 40 degrees   Abduction: 25 degrees     Passive Range of Motion   Left Hip   Normal passive range of motion    Right Hip   Flexion: 60 degrees Abduction: 30 degrees     Additional Passive Range of Motion Details  Mod HS tightness on R    IR and ER grossly limited by at least 50% but end ranges not stressed     Strength/Myotome Testing     Left Hip   Normal muscle strength    Right Hip   Planes of Motion   Flexion: 4-  Extension: 3+  Abduction: 3+  External rotation: 3+  Internal rotation: 3+    Additional Strength Details  R knee flex and ext grossly 4-/5 w/o pain, L 4+/5    Tests     Additional Tests Details  Deferred s/t knowledge of diagnosis     Ambulation     Ambulation: Level Surfaces     Additional Level Surfaces Ambulation Details  With walker,  Step through gait pattern w/ decreased WB through R LE, min R side trunk lean, decreased step length on R, decreased pace    Functional Assessment     Comments  Sit<>stand: heavy UE use and L side trunk lean, uses momentum       Flowsheet Rows      Most Recent Value   PT/OT G-Codes   Current Score  45   Projected Score  56   FOTO information reviewed  Yes   Assessment Type  Evaluation   G code set  Mobility: Walking & Moving Around   Mobility: Walking and Moving Around Current Status ()  CK   Mobility: Walking and Moving Around Goal Status ()  CJ          Precautions posterior hip precautions     Specialty Daily Treatment Diary     Manual         LAD        DTM and TPR                                     Exercise Diary         PROM        HS stretching        glute stretching         SLR        Add sq        bridging        Heel raises        gastroc stretching        Sit<>stand        Mini squats        abd walking        Hip flexion                                                                Stationary bike            Modalities        Heat pre        Ice post

## 2018-06-12 NOTE — PROGRESS NOTES
PT Evaluation     Today's date: 2018  Patient name: Ibis Miranda  : 1965  MRN: [id number]  Referring provider: Melida Carney MD  Dx:   Encounter Diagnosis     ICD-10-CM    1  Pain in right hip M25 551                   Assessment  Impairments: abnormal gait, abnormal muscle firing, abnormal muscle tone, abnormal or restricted ROM, abnormal movement, activity intolerance, impaired physical strength, lacks appropriate home exercise program and pain with function    Assessment details: Pt is a pleasant 48 y o  female who [facility] to [facility] s/p R ROSAMARIA performed on 2018  Today, she presents with decreased R hip ROM and strength, soft tissue density restrictions, high self reports of pain, and decreased tolerance to functional activity  Functionally, she is limited in her ability to stand and ambulate, perform functional transfers including bed mobility, negotiate stairs, and perform normal recreational and household activities  She is extremely motivated to improve  Pt will benefit from skilled PT to address the aforementioned deficits and limitations in an effort to maximize pain free functional mobility and overall quality of life  Progress as able with these goals in mind  Understanding of Dx/Px/POC: good   Prognosis: good    Goals  Short term goals (3 weeks):  1) Pt will improve R hip ROM for flexion, abduction, and IR/ER to expected limits per restrictions w/o pain  2) Pt will improve R hip MMT testing by at least 1/3 grade  3) Pt will report pain at worse <5/10  4) Pt will initiate and progress HEP w/ special emphasis on functional ROM and strength  Long term goals (6 weeks)  1) Pt will improve FOTO to at least 65  2) Pt will stand and ambulate for durations greater than 30 min w/ normalized gait mechanics and no pain  3) Pt will be functionally unlimited w/ stairs in order to reach second floor of home w/o issue    4) Pt will be independent and compliant w/ HEP in order to maximize functional benefit of skilled PT following d/c        Plan  Patient would benefit from: skilled PT  Planned modality interventions: cryotherapy and thermotherapy: hydrocollator packs  Planned therapy interventions: abdominal trunk stabilization, activity modification, joint mobilization, manual therapy, massage, motor coordination training, neuromuscular re-education, patient education, postural training, stretching, strengthening, therapeutic activities, therapeutic exercise, therapeutic training, transfer training, home exercise program, gait training, graded activity, functional ROM exercises, graded exercise, flexibility, body mechanics training, balance and balance/weight bearing training  Frequency: 2x week  Duration in visits: 12  Duration in weeks: 6  Treatment plan discussed with: patient  Plan details: POC to include: bike, stretching, DTM, hip and core stab, standing functional movement training     HEP to start: HEP from hospital reviewed           Subjective Evaluation    History of Present Illness  Date of surgery: 2018  Mechanism of injury: Pt returns to skilled PT after undergoing elective ROSAMARIA on 2018  She had previously been seen following labral surgery that failed to improve pain  Tommas Bracket will be removed this Friday, 6/15  Sleeping is difficult at night s/t pain  Normally a side sleeper but is unable to at this time  Pt was given oxycodone for pain, taking a quarter of one pill every 4-6 hours  Does offer some relief before making her very groggy  Pt is hopeful that she will be able to move around pain free following rehab  Pt is very motivated to improve   Functional status is as follows:   Quality of life: good    Pain  Current pain ratin  At best pain ratin  At worst pain ratin  Location: R posterior and lateral hip  Quality: sharp, dull ache, burning and pulling  Relieving factors: ice, medications and rest  Aggravating factors: sitting, standing, walking and stair climbing  Progression: improved    Social Support  Steps to enter house: yes  Stairs in house: yes   Lives in: multiple-level home  Lives with: spouse and adult children (college aged child)    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)  Treatments  Previous treatment: physical therapy  Patient Goals  Patient goals for therapy: increased strength, return to sport/leisure activities, decreased pain and increased motion  Patient goal: increase stability, flexilbility         Objective     Palpation     Additional Palpation Details  TTP through lateral and posterior hip on R, along site of incision, mod increase in tissue density in these areas as well       Neurological Testing     Sensation     Hip   Left Hip   Intact: light touch    Right Hip   Intact: light touch    Active Range of Motion   Left Hip   Normal active range of motion    Right Hip   Flexion: 40 degrees   Abduction: 25 degrees     Passive Range of Motion   Left Hip   Normal passive range of motion    Right Hip   Flexion: 60 degrees   Abduction: 30 degrees     Additional Passive Range of Motion Details  Mod HS tightness on R    IR and ER grossly limited by at least 50% but end ranges not stressed     Strength/Myotome Testing     Left Hip   Normal muscle strength    Right Hip   Planes of Motion   Flexion: 4-  Extension: 3+  Abduction: 3+  External rotation: 3+  Internal rotation: 3+    Additional Strength Details  R knee flex and ext grossly 4-/5 w/o pain, L 4+/5    Tests     Additional Tests Details  Deferred s/t knowledge of diagnosis     Ambulation     Ambulation: Level Surfaces     Additional Level Surfaces Ambulation Details  With walker,  Step through gait pattern w/ decreased WB through R LE, min R side trunk lean, decreased step length on R, decreased pace    Functional Assessment     Comments  Sit<>stand: heavy UE use and L side trunk lean, uses momentum       Flowsheet Rows      Most Recent Value PT/OT G-Codes   Current Score  45   Projected Score  64   FOTO information reviewed  Yes   Assessment Type  Evaluation   G code set  Mobility: Walking & Moving Around   Mobility: Walking and Moving Around Current Status ()  CK   Mobility: Walking and Moving Around Goal Status ()  CJ          Precautions posterior hip precautions     Specialty Daily Treatment Diary     Manual         LAD        DTM and TPR                                     Exercise Diary         PROM        HS stretching        glute stretching         SLR        Add sq        bridging        Heel raises        gastroc stretching        Sit<>stand        Mini squats        abd walking        Hip flexion                                                                Stationary bike            Modalities        Heat pre        Ice post

## 2018-06-14 ENCOUNTER — OFFICE VISIT (OUTPATIENT)
Dept: PHYSICAL THERAPY | Facility: CLINIC | Age: 53
End: 2018-06-14
Payer: COMMERCIAL

## 2018-06-14 DIAGNOSIS — M25.551 PAIN IN RIGHT HIP: Primary | ICD-10-CM

## 2018-06-14 PROCEDURE — 97112 NEUROMUSCULAR REEDUCATION: CPT

## 2018-06-14 PROCEDURE — 97140 MANUAL THERAPY 1/> REGIONS: CPT

## 2018-06-14 PROCEDURE — 97110 THERAPEUTIC EXERCISES: CPT

## 2018-06-14 NOTE — PROGRESS NOTES
Daily Note     Today's date: 2018  Patient name: Andrew Burnett  : 1965  MRN: [id number]  Referring provider: Terald Canavan, MD  Dx:   Encounter Diagnosis     ICD-10-CM    1  Pain in right hip M25 551                   Subjective: Pt notes 5/10 pain to start session  Notes that muscles in B hips are very sore, has swelling into R knee and ankle  Objective: Pt noted to have min/mod edema through R side ankle and lower leg, responds well to effleurage  Able to take ~50% of BW onto R side w/ WS using UE support  Precautions posterior hip precautions     Specialty Daily Treatment Diary     Manual         LAD        DTM and TPR  Using "stick" to R quads, ITB, TFL x 4 mins        DTM and effleurage to R lower leg and ankle x 8 mins                            Exercise Diary  2       PROM To R hip for flex and abd x 10 mins total       HS stretching 3x30 sec holds       glute stretching  Gentle 2x30 secs       SLR        Add sq 3 sec hold 2x10       bridging W/ ad sq 2x10       Heel raises        gastroc stretching 3x30 sec self       Sit<>stand        Mini squats 2x10 at EOB       abd walking        Hip flexion         WS training x 6 mins total, medial/lateral and diagonal                                                        Stationary bike no           Modalities 2       Heat pre no       Ice post  no                   Assessment: Tolerated treatment well  Patient demonstrated fatigue post treatment and would benefit from continued PT  Does very well w/ today's progressions, noting fatigue but no pain by end  Pt is very aware of restrictions and HEP  She notes less sx at end as compared to beginning  Progress as able  Plan: Continue per plan of care   gait, SLR, hip abd, heel raises, marching, steps

## 2018-06-19 ENCOUNTER — OFFICE VISIT (OUTPATIENT)
Dept: PHYSICAL THERAPY | Facility: CLINIC | Age: 53
End: 2018-06-19
Payer: COMMERCIAL

## 2018-06-19 DIAGNOSIS — M25.551 PAIN IN RIGHT HIP: Primary | ICD-10-CM

## 2018-06-19 PROCEDURE — 97110 THERAPEUTIC EXERCISES: CPT

## 2018-06-19 PROCEDURE — 97112 NEUROMUSCULAR REEDUCATION: CPT

## 2018-06-19 NOTE — PROGRESS NOTES
Daily Note     Today's date: 2018  Patient name: Coleman Prado  : 1965  MRN: [id number]  Referring provider: Skip Carson MD  Dx:   Encounter Diagnosis     ICD-10-CM    1  Pain in right hip M25 551                   Subjective: Pt reports that R hip is sore, but not too bad  Has been doing a lot of walking at home  Objective: Requires cueing throughout to prevent dynamic genu valgum on R but corrects well  Precautions posterior hip precautions     Specialty Daily Treatment Diary     Manual        LAD        DTM and TPR  Using "stick" to R quads, ITB, TFL x 4 mins        DTM and effleurage to R lower leg and ankle x 8 mins                            Exercise Diary  2 3      PROM To R hip for flex and abd x 10 mins total To R hip for flex and abd x 8 mins total      HS stretching 3x30 sec holds 3x30 sec holds       glute stretching  Gentle 2x30 secs       SLR  Attempted       Add sq 3 sec hold 2x10 3 sec hold 3x10      bridging W/ ad sq 2x10 3x10       Heel raises        gastroc stretching 3x30 sec self       Sit<>stand        Mini squats 2x10 at EOB 2x10 at EOB      abd walking        Hip flexion         WS training x 6 mins total, medial/lateral and diagonal  x4 mins total         Stair training x 4 mins        Anterior step ups 4" 2x10        4" lateral glute med taps 2x10                               Stationary bike no NU step pre x 6 min          Modalities 2 3      Heat pre no no      Ice post  no no                    Assessment: Tolerated treatment well  Patient demonstrated fatigue post treatment and would benefit from continued PT  Does very well w/ stair training today, noting no pain throughout  Hip weakness evident in min/mod dynamic genu valgum throughout, she responds well to cueing to help decrease this  Progress as able at next session  Plan: Continue per plan of care   gait training

## 2018-06-20 ENCOUNTER — OFFICE VISIT (OUTPATIENT)
Dept: PHYSICAL THERAPY | Facility: CLINIC | Age: 53
End: 2018-06-20
Payer: COMMERCIAL

## 2018-06-20 DIAGNOSIS — M25.551 PAIN IN RIGHT HIP: Primary | ICD-10-CM

## 2018-06-20 PROCEDURE — 97110 THERAPEUTIC EXERCISES: CPT

## 2018-06-20 PROCEDURE — 97112 NEUROMUSCULAR REEDUCATION: CPT

## 2018-06-20 NOTE — PROGRESS NOTES
Daily Note     Today's date: 2018  Patient name: Saloni Maza  : 1965  MRN: [id number]  Referring provider: Felicia Kang MD  Dx:   Encounter Diagnosis     ICD-10-CM    1  Pain in right hip M25 551                   Subjective: Pt reports that she was very sore and tired after last session (yesterday)  Felt like a good workout and was able to perform steps on own after this  Excited by progress  Objective: Performs SLR w/ min knee lag, corrects well w/ cueing but fatigues quickly  Shows improved gait mechanics post exaggerated gait  Precautions posterior hip precautions     Specialty Daily Treatment Diary     Manual       LAD        DTM and TPR  Using "stick" to R quads, ITB, TFL x 4 mins        DTM and effleurage to R lower leg and ankle x 8 mins                            Exercise Diary  2 3 4     PROM To R hip for flex and abd x 10 mins total To R hip for flex and abd x 8 mins total Flex/abd x 5 mins total      HS stretching 3x30 sec holds 3x30 sec holds  4x30 sec holds      glute stretching  Gentle 2x30 secs       SLR  Attempted  4x5      Add sq 3 sec hold 2x10 3 sec hold 3x10 Manual abd/add iso holds 2x10     bridging W/ ad sq 2x10 3x10  W/ ad sq 3x10     Heel raises        gastroc stretching 3x30 sec self       Sit<>stand        Mini squats 2x10 at EOB 2x10 at EOB Reviewed      abd walking   Lateral walks 50'x8     Hip flexion         WS training x 6 mins total, medial/lateral and diagonal  x4 mins total  x2 mins        Stair training x 4 mins        Anterior step ups 4" 2x10        4" lateral glute med taps 2x10          Exaggerated gait 50'x8, gait training review         Standing hip abd and ext kicks 3x10, HS curls 3x10              Stationary bike no NU step pre x 6 min NU step pre x 8 min         Modalities 2 3 4     Heat pre no no no     Ice post  no no no                   Assessment: Tolerated treatment well   Patient demonstrated fatigue post treatment and would benefit from continued PT  Pt does very well in back to back sessions  Shows less soreness by end of session, able to perform SLR w/ min knee ext lag for first time today  Gait mechanics slowly improving  Excellent tolerance to skilled progressions thus far  Plan: Continue per plan of care    steps, hip abd walking, monster walks

## 2018-06-21 ENCOUNTER — APPOINTMENT (OUTPATIENT)
Dept: PHYSICAL THERAPY | Facility: CLINIC | Age: 53
End: 2018-06-21
Payer: COMMERCIAL

## 2018-06-25 ENCOUNTER — OFFICE VISIT (OUTPATIENT)
Dept: PHYSICAL THERAPY | Facility: CLINIC | Age: 53
End: 2018-06-25
Payer: COMMERCIAL

## 2018-06-25 DIAGNOSIS — M25.551 PAIN IN RIGHT HIP: Primary | ICD-10-CM

## 2018-06-25 PROCEDURE — 97140 MANUAL THERAPY 1/> REGIONS: CPT

## 2018-06-25 PROCEDURE — 97110 THERAPEUTIC EXERCISES: CPT

## 2018-06-25 NOTE — PROGRESS NOTES
Daily Note     Today's date: 2018  Patient name: Toyin Castaneda  : 1965  MRN: [id number]  Referring provider: Marylou Cuevas MD  Dx:   Encounter Diagnosis     ICD-10-CM    1  Pain in right hip M25 551        Start Time: 925  Stop Time:   Total time in clinic (min): 50 minutes    Subjective: Pt states her right hip is sore; came in using her SPC today - knows she uses it in the wrong hand but can't get use to using it in the left hand; able to go up her steps with the right leg but it is sore; feels sore with sitting & not able to sleep on the right          Objective: Performs SLR w/ min knee lag, corrects well w/ cueing but fatigues quickly  Shows improved gait mechanics post exaggerated gait         Precautions posterior hip precautions     Specialty Daily Treatment Diary     Manual      LAD        DTM and TPR  Using "stick" to R quads, ITB, TFL x 4 mins   Manual STM to right lateral/post hip in left S/L x 10 min; brief scar massage & instructed in the same      DTM and effleurage to R lower leg and ankle x 8 mins            Manual adductor & HS stretch 3 x 30 sec ea                 Exercise Diary  2 3 4 5    PROM To R hip for flex and abd x 10 mins total To R hip for flex and abd x 8 mins total Flex/abd x 5 mins total  Flex/abd x 3 min     HS stretching 3x30 sec holds 3x30 sec holds  4x30 sec holds  Self w SOS 3 x 30sec     glute stretching  Gentle 2x30 secs   Hip flex peanut   10 x 2     SLR  Attempted  4x5  4 x5    Add sq 3 sec hold 2x10 3 sec hold 3x10 Manual abd/add iso holds 2x10     bridging W/ ad sq 2x10 3x10  W/ ad sq 3x10 3 x 10     Heel raises        gastroc stretching 3x30 sec self   Self 3 x 30 sec     Sit<>stand        Mini squats 2x10 at EOB 2x10 at EOB Reviewed      abd walking   Lateral walks 50'x8     Hip flexion    LAQ 2 x 10 hold 5 s      WS training x 6 mins total, medial/lateral and diagonal  x4 mins total  x2 mins        Stair training x 4 mins Anterior step ups 4" 2x10  4 inch & 6 inch   X 10 ea       4" lateral glute med taps 2x10          Exaggerated gait 50'x8, gait training review  GT w SPC in Left hand on level surfaces        Standing hip abd and ext kicks 3x10, HS curls 3x10  2 x 10 ea         Bent knee fallout x 10     Stationary bike no NU step pre x 6 min NU step pre x 8 min NS pre x 8 min        Modalities 2 3 4 5     Heat pre no no no no    Ice post  no no no No                     Assessment: Tolerated treatment well  Patient would benefit from continued PT; pt arrived with limp utilizing SPC in right hand - gait much improved post education to use the  cane in the left hand with patient in agreement; pt presents with tenderness right lateral hip with palpable soft tissue restriction - steri strips remain intact; pt performs SLR without quad lag post instruction to hold quad set - performs in sets of 5 due to fatigue; able to perform 6 inch step ups        Plan: Continue per plan of care   Resume lateral walks & minisquats next session

## 2018-06-28 ENCOUNTER — OFFICE VISIT (OUTPATIENT)
Dept: PHYSICAL THERAPY | Facility: CLINIC | Age: 53
End: 2018-06-28
Payer: COMMERCIAL

## 2018-06-28 DIAGNOSIS — M25.551 PAIN IN RIGHT HIP: Primary | ICD-10-CM

## 2018-06-28 PROCEDURE — G8979 MOBILITY GOAL STATUS: HCPCS

## 2018-06-28 PROCEDURE — G8978 MOBILITY CURRENT STATUS: HCPCS

## 2018-06-28 PROCEDURE — 97112 NEUROMUSCULAR REEDUCATION: CPT

## 2018-06-28 PROCEDURE — 97140 MANUAL THERAPY 1/> REGIONS: CPT

## 2018-06-28 NOTE — PROGRESS NOTES
Daily Note     Today's date: 2018  Patient name: Jim Diamond  : 1965  MRN: [id number]  Referring provider: Stephen Baron MD  Dx:   Encounter Diagnosis     ICD-10-CM    1  Pain in right hip M25 551                   Subjective: Pt arrives today w/o cane or other AD  Reports that she has been trying to walk w/o AD more often and thinks this is going well  No new complaints overall  Felt good after last session  Objective: Pt  Responds well to cueing for hip drive and proper knee mechanics w/ all step activities  Fatigues quickly w/ YTB hip abd walking but shows good form once cued        Precautions posterior hip precautions     Specialty Daily Treatment Diary     Manual     LAD        DTM and TPR  Using "stick" to R quads, ITB, TFL x 4 mins   Manual STM to right lateral/post hip in left S/L x 10 min; brief scar massage & instructed in the same  The stick to L ITB and lateral hip x 8 min     DTM and effleurage to R lower leg and ankle x 8 mins            Manual adductor & HS stretch 3 x 30 sec ea                 Exercise Diary  2 3 4 5 6   PROM To R hip for flex and abd x 10 mins total To R hip for flex and abd x 8 mins total Flex/abd x 5 mins total  Flex/abd x 3 min  Flex/abd x 3 min    HS stretching 3x30 sec holds 3x30 sec holds  4x30 sec holds  Self w SOS 3 x 30sec  Manual 3x30 sec holds    glute stretching  Gentle 2x30 secs   Hip flex peanut   10 x 2     SLR  Attempted  4x5  4 x5 2x10   Add sq 3 sec hold 2x10 3 sec hold 3x10 Manual abd/add iso holds 2x10     bridging W/ ad sq 2x10 3x10  W/ ad sq 3x10 3 x 10  3x10   Heel raises        gastroc stretching 3x30 sec self   Self 3 x 30 sec  Self 3 x 30 sec   Sit<>stand        Mini squats 2x10 at EOB 2x10 at EOB Reviewed   Clams 3x10   abd walking   Lateral walks 50'x8  W/ YTB 50'x4, monster walks 50'x4   Hip flexion    LAQ 2 x 10 hold 5 s      WS training x 6 mins total, medial/lateral and diagonal  x4 mins total x2 mins        Stair training x 4 mins        Anterior step ups 4" 2x10  4 inch & 6 inch   X 10 ea  4-6" anterior and lateral step ups 2-3x10 each     4" lateral glute med taps 2x10    SLS on airex 3x20 sec holds       Exaggerated gait 50'x8, gait training review  GT w SPC in Left hand on level surfaces        Standing hip abd and ext kicks 3x10, HS curls 3x10  2 x 10 ea  Exaggerated gait in // bars x 3 mins       Bent knee fallout x 10     Stationary bike no NU step pre x 6 min NU step pre x 8 min NS pre x 8 min NU step pre x 8 min       Modalities 2 3 4 5  6   Heat pre no no no no no   Ice post  no no no No  no               Assessment: Tolerated treatment well  Patient demonstrated fatigue post treatment and would benefit from continued PT  Pt does well w/ increased exercise program  Notes fatigue but no pain, good attention to form throughout  Pt safe to perform exaggerated gait on own at home w/ UE support  Plan: Continue per plan of care   step progressions, hip abd into ball on wall, marching

## 2018-07-02 ENCOUNTER — OFFICE VISIT (OUTPATIENT)
Dept: PHYSICAL THERAPY | Facility: CLINIC | Age: 53
End: 2018-07-02
Payer: COMMERCIAL

## 2018-07-02 DIAGNOSIS — M25.551 PAIN IN RIGHT HIP: Primary | ICD-10-CM

## 2018-07-02 PROCEDURE — 97112 NEUROMUSCULAR REEDUCATION: CPT

## 2018-07-02 PROCEDURE — 97110 THERAPEUTIC EXERCISES: CPT

## 2018-07-02 NOTE — PROGRESS NOTES
Daily Note     Today's date: 2018  Patient name: Hoang Will  : 1965  MRN: [id number]  Referring provider: Makenzie Greer MD  Dx:   Encounter Diagnosis     ICD-10-CM    1  Pain in right hip M25 551                   Subjective: Pt reports that hip is very tight but that she feels she is improving  Notes greater ease w/ all movement  Objective:   Pt responds well to cueing for proper knee positioning and hip activation during monster walks, hip abd walks, and resisted marching      Precautions: posterior hip precautions     Specialty Daily Treatment Diary     Manual     LAD        DTM and TPR  The stick to L ITB and lateral hip x 5 min    Manual STM to right lateral/post hip in left S/L x 10 min; brief scar massage & instructed in the same  The stick to L ITB and lateral hip x 8 min                Manual adductor & HS stretch 3 x 30 sec ea                 Exercise Diary  7  4 5 6   PROM x3-4 mins   Flex/abd x 5 mins total  Flex/abd x 3 min  Flex/abd x 3 min    HS stretching 3x30 sec holds, ITB and TFL stretch 3x30 on R  4x30 sec holds  Self w SOS 3 x 30sec  Manual 3x30 sec holds    glute stretching     Hip flex peanut   10 x 2     SLR 2x10  4x5  4 x5 2x10   Add sq   Manual abd/add iso holds 2x10     bridging 3x10 w/ ad sq  W/ ad sq 3x10 3 x 10  3x10   Heel raises        gastroc stretching    Self 3 x 30 sec  Self 3 x 30 sec   Sit<>stand        Mini squats W/ YTB around toes 3x10  Reviewed   Clams 3x10   abd walking YTB 50'x8, monster walks 50'x6  Lateral walks 50'x8  W/ YTB 50'x4, monster walks 50'x4   Hip flexion Resisted YTB hip flexion 2x10 at bar    LAQ 2 x 10 hold 5 s        x2 mins               6" 2x10   4 inch & 6 inch   X 10 ea  4-6" anterior and lateral step ups 2-3x10 each        SLS on airex 3x20 sec holds       Exaggerated gait 50'x8, gait training review  GT w SPC in Left hand on level surfaces      Exaggerated gait in // bars x 3 mins  Standing hip abd and ext kicks 3x10, HS curls 3x10  2 x 10 ea  Exaggerated gait in // bars x 3 mins       Bent knee fallout x 10     Stationary bike Stationary bike pre x 7 min no resistance   NU step pre x 8 min NS pre x 8 min NU step pre x 8 min       Modalities 7  4 5  6   Heat pre no  no no no   Ice post  no  no No  no                   Assessment: Tolerated treatment well  Patient demonstrated fatigue post treatment and would benefit from continued PT  Shows improving form w/ gait each session  Hip ROM is improving, as is ability to move through available range  Continue w/ gentle progressions as able, emphasis on functional strength  Plan: Continue per plan of care   cone taps, step overs, pball squats

## 2018-07-05 ENCOUNTER — OFFICE VISIT (OUTPATIENT)
Dept: PHYSICAL THERAPY | Facility: CLINIC | Age: 53
End: 2018-07-05
Payer: COMMERCIAL

## 2018-07-05 DIAGNOSIS — M25.551 PAIN IN RIGHT HIP: Primary | ICD-10-CM

## 2018-07-05 PROCEDURE — G8978 MOBILITY CURRENT STATUS: HCPCS

## 2018-07-05 PROCEDURE — G8979 MOBILITY GOAL STATUS: HCPCS

## 2018-07-05 PROCEDURE — 97112 NEUROMUSCULAR REEDUCATION: CPT

## 2018-07-05 PROCEDURE — 97110 THERAPEUTIC EXERCISES: CPT

## 2018-07-05 NOTE — PROGRESS NOTES
Progress Note     Today's date: 2018  Patient name: Chacha Street  : 1965  MRN: [id number]  Referring provider: Juan Antonio Welch MD  Dx:   Encounter Diagnosis     ICD-10-CM    1  Pain in right hip M25 551                   Subjective: Pt arrives today w/ a smile, notes that she feels like she is really improving  Pain has been minimal, she is able to stand and ambulate for longer, and she notes that strength is improving  Excited by progress  Objective: Hip flexion ROM to  deg actively w/o pain, abd to ~45 w/o pain, ext to ~10 w/o pain  Hip MMT 4-/5 for flexion and abduction, 4/5 for ext, IR, ER  Pt ambulates w/ R sided trunk lean and mod trendelenburg, no AD, good pacing  Short term goals (3 weeks):  1) Pt will improve R hip ROM for flexion, abduction, and IR/ER to expected limits per restrictions w/o pain  - achieved  2) Pt will improve R hip MMT testing by at least 1/3 grade  - achieved   3) Pt will report pain at worse <5/10  - achieved   4) Pt will initiate and progress HEP w/ special emphasis on functional ROM and strength  - achieved     Long term goals (6 weeks) - extended 3 weeks  1) Pt will improve FOTO to at least 65  - progressed  2) Pt will stand and ambulate for durations greater than 30 min w/ normalized gait mechanics and no pain  - progressed  3) Pt will be functionally unlimited w/ stairs in order to reach second floor of home w/o issue  - progressed  4) Pt will be independent and compliant w/ HEP in order to maximize functional benefit of skilled PT following d/c  - progressed    New Long Term Goals (6 weeks)  1) Pt will be functionally unlimited by hip w/ stairs, ambulation, transfers, dressing, and age appropriate household activities  2) Pt will be (I) w/ final HEP      Precautions: posterior hip precautions     Specialty Daily Treatment Diary     Manual     LAD        DTM and TPR  The stick to L ITB and lateral hip x 5 min  x5 mins to same area  Manual STM to right lateral/post hip in left S/L x 10 min; brief scar massage & instructed in the same  The stick to L ITB and lateral hip x 8 min                Manual adductor & HS stretch 3 x 30 sec ea                 Exercise Diary  7 8  5 6   PROM x3-4 mins  x2-3 mins  Flex/abd x 3 min  Flex/abd x 3 min    HS stretching 3x30 sec holds, ITB and TFL stretch 3x30 on R Self sciatic nerve floss x 15 B  Self w SOS 3 x 30sec  Manual 3x30 sec holds    glute stretching   Prone HF stretch 2x30 sec  Hip flex peanut   10 x 2     SLR 2x10 2x10 B  4 x5 2x10   Add sq  Prone hip ext 3x10      bridging 3x10 w/ ad sq 3x10  3 x 10  3x10   Heel raises        gastroc stretching    Self 3 x 30 sec  Self 3 x 30 sec   Sit<>stand  W/ YTB around knees from airex 3x10      Mini squats W/ YTB around toes 3x10 TRX strap squats 3x10, focus on hip drive   Clams [id number]   abd walking YTB 50'x8, monster walks 50'x6 YTB 50'x8-10   W/ YTB 50'x4, monster walks 50'x4   Hip flexion Resisted YTB hip flexion 2x10 at bar  2x10  LAQ 2 x 10 hold 5 s                      6" 2x10 BOSU step ups 3x10  4 inch & 6 inch   X 10 ea  4-6" anterior and lateral step ups 2-3x10 each     4" glute med taps 3x10   SLS on airex 3x20 sec holds        GT w SPC in Left hand on level surfaces      Exaggerated gait in // bars x 3 mins   2 x 10 ea  Exaggerated gait in // bars x 3 mins       Bent knee fallout x 10     Stationary bike Stationary bike pre x 7 min no resistance  Pre x 7 min, lvl 3  NS pre x 8 min NU step pre x 8 min       Modalities 7 8  5  6   Heat pre no no  no no   Ice post  no no  No  no                 Assessment: Tolerated treatment well  Patient demonstrated fatigue post treatment, exhibited good technique with therapeutic exercises and would benefit from continued PT  Excellent progress to date  She has met all short term goals and is making progress towards long term goals   Gait mechanics are normalizing and she shows greater control through all step and squatting activities  She will follow up w/ MD next week, progress per his wishes        Plan: Continue per plan of care  higher level stability as allowed

## 2018-07-10 ENCOUNTER — OFFICE VISIT (OUTPATIENT)
Dept: PHYSICAL THERAPY | Facility: CLINIC | Age: 53
End: 2018-07-10
Payer: COMMERCIAL

## 2018-07-10 DIAGNOSIS — M25.551 PAIN IN RIGHT HIP: Primary | ICD-10-CM

## 2018-07-10 PROCEDURE — 97140 MANUAL THERAPY 1/> REGIONS: CPT

## 2018-07-10 PROCEDURE — 97110 THERAPEUTIC EXERCISES: CPT

## 2018-07-10 NOTE — PROGRESS NOTES
Daily Note     Today's date: 7/10/2018  Patient name: Marilee Lee  : 1965  MRN: [id number]  Referring provider: Lolly Sebastian MD  Dx:   Encounter Diagnosis     ICD-10-CM    1   Pain in right hip M25 551        Start Time: 930  Stop Time:   Total time in clinic (min): 45 minutes    Subjective: Pt saw MD - does not want her to start gardening as yet - wants her to  keep the 90 degree precaution; states she went into [employer]? - did a lot of walking - not sure if she pulled a muscle in her inner thigh        Objective: See treatment diary below    Precautions: posterior hip precautions     Specialty Daily Treatment Diary     Manual  7/2 7/5 7/10   6/28   LAD        DTM and TPR  The stick to L ITB and lateral hip x 5 min  x5 mins to same area X 10 min to right adductors, quads  Manual STM to right lateral/post hip in left S/L x 10 min; brief scar massage & instructed in the same  The stick to L ITB and lateral hip x 8 min       Manual quad stretch prone 3 x 30 sec         Manual stretch adductors 5 x 10 sec  Manual adductor & HS stretch 3 x 30 sec ea                 Exercise Diary  7 8 9    6   PROM x3-4 mins  x2-3 mins  Flex/abd x 3 min  Flex/abd x 3 min    HS stretching 3x30 sec holds, ITB and TFL stretch 3x30 on R Self sciatic nerve floss x 15 B  Self w SOS 3 x 30sec  Manual 3x30 sec holds    glute stretching   Prone HF stretch 2x30 sec  Hip flex peanut   10 x 2     SLR 2x10 2x10 B 2 x 10  4 x5 2x10   Add sq  Prone hip ext 3x10 3 x 10      bridging 3x10 w/ ad sq 3x10 3 x 10  3 x 10  3x10   Heel raises        gastroc stretching    Self 3 x 30 sec  Self 3 x 30 sec   Sit<>stand  W/ YTB around knees from airex 3x10      Mini squats W/ YTB around toes 3x10 TRX strap squats 3x10, focus on hip drive   Clams [id number]   abd walking YTB 50'x8, monster walks 50'x6 YTB 50'x8-10   W/ YTB 50'x4, monster walks 50'x4   Hip flexion Resisted YTB hip flexion 2x10 at bar  2x10  LAQ 2 x 10 hold 5 s 6" 2x10 BOSU step ups 3x10  4 inch & 6 inch   X 10 ea  4-6" anterior and lateral step ups 2-3x10 each     4" glute med taps 3x10   SLS on airex 3x20 sec holds       Pt instructed in prone quad stretch with SOS  GT w SPC in Left hand on level surfaces      Exaggerated gait in // bars x 3 mins  Stance hip flex, ext, abd w 5 sec hold  2x 10 ea  2 x 10 ea  Exaggerated gait in // bars x 3 mins      Bent knee fallout 2 x 10  Bent knee fallout x 10     Stationary bike Stationary bike pre x 7 min no resistance  Pre x 7 min, lvl 3 Pre x 10 min  Trial TM  NU step pre x 8 min       Modalities 7 8 9   6   Heat pre no no No  no no   Ice post  no no No  No  no                     Assessment: Tolerated treatment well  Patient would benefit from continued PT; pt arrived with + limp - much improved post session - decreased side to side sway;  pt reported increased relief post mod deep STM of right adductors & manual stretch of quads; cueing to hold therex for longer hold time of 5 sec       Plan: Continue per plan of care   pt to add bent knee fallout & self stretch of quads(  with dog leash or belt ) to home program; gait much improved  at end of session; encouraged pt to use [facility] if she goes for another  extended walk in big stores like [employer]; trial on TM to normalize gait

## 2018-07-12 ENCOUNTER — OFFICE VISIT (OUTPATIENT)
Dept: PHYSICAL THERAPY | Facility: CLINIC | Age: 53
End: 2018-07-12
Payer: COMMERCIAL

## 2018-07-12 DIAGNOSIS — M25.551 PAIN IN RIGHT HIP: Primary | ICD-10-CM

## 2018-07-12 PROCEDURE — 97112 NEUROMUSCULAR REEDUCATION: CPT

## 2018-07-12 PROCEDURE — 97110 THERAPEUTIC EXERCISES: CPT

## 2018-07-12 NOTE — PROGRESS NOTES
Daily Note     Today's date: 2018  Patient name: Claudio Sanchez  : 1965  MRN: [id number]  Referring provider: Danie Bean MD  Dx:   Encounter Diagnosis     ICD-10-CM    1  Pain in right hip M25 551                    Subjective: Pt still reports pain in R medial quad, felt better after last session but is sore again today  Pain does not prevent her from moving but is bothersome  Overall feels that she is improving  Objective: Pt shows good form but fatigues quickly w/ TRX squats  Shows improved hip drive during tband activities        Precautions: posterior hip precautions     Specialty Daily Treatment Diary     Manual   7 7/10  7/12    LAD        DTM and TPR  The stick to L ITB and lateral hip x 5 min  x5 mins to same area X 10 min to right adductors, quads         Manual quad stretch prone 3 x 30 sec         Manual stretch adductors 5 x 10 sec                  Exercise Diary  7 8 9   10    PROM x3-4 mins  x2-3 mins  x2-3 mins    HS stretching 3x30 sec holds, ITB and TFL stretch 3x30 on R Self sciatic nerve floss x 15 B  Manual HS, HF stretching 3x30 sec holds each for R side    glute stretching   Prone HF stretch 2x30 sec  3x30 sec holds     SLR 2x10 2x10 B 2 x 10      Add sq  Prone hip ext 3x10 3 x 10      bridging 3x10 w/ ad sq 3x10 3 x 10  3x10 on peanut    Heel raises        gastroc stretching        Sit<>stand  W/ YTB around knees from airex 3x10  From airex, focus on ecc x 10, from level chair height 2x10    Mini squats W/ YTB around toes 3x10 TRX strap squats 3x10, focus on hip drive  W/ straps [id number], focus on ecc    abd walking YTB 50'x8, monster walks 50'x6 YTB 50'x8-10  GTB 50'x8, monster walk 50'x8    Hip flexion Resisted YTB hip flexion 2x10 at bar  2x10                       6" 2x10 BOSU step ups 3x10  8" step ups min UE support 3x10 on R       4" glute med taps 3x10  4" glute med taps 3x10 on R       Pt instructed in prone quad stretch with SOS Exaggerated gait in // bars x 3 mins  Stance hip flex, ext, abd w 5 sec hold  2x 10 ea         Bent knee fallout 2 x 10      Stationary bike Stationary bike pre x 7 min no resistance  Pre x 7 min, lvl 3 Pre x 10 min  Pre x 7 min, lvl 3        Modalities 7 8 9  10    Heat pre no no No  no    Ice post  no no No  no                        Assessment: Tolerated treatment well  Patient demonstrated fatigue post treatment and would benefit from continued PT  Continues to show improved gait mechanics and muscle activation throughout, notes fatigue but no increase in sx by end of session  Appropriate from higher level progressions as sx allow at next session  Plan: Continue per plan of care   exaggerated gait, pball squats, lunges, balance

## 2018-07-16 ENCOUNTER — OFFICE VISIT (OUTPATIENT)
Dept: PHYSICAL THERAPY | Facility: CLINIC | Age: 53
End: 2018-07-16
Payer: COMMERCIAL

## 2018-07-16 DIAGNOSIS — M25.551 PAIN IN RIGHT HIP: Primary | ICD-10-CM

## 2018-07-16 PROCEDURE — 97110 THERAPEUTIC EXERCISES: CPT

## 2018-07-16 PROCEDURE — 97112 NEUROMUSCULAR REEDUCATION: CPT

## 2018-07-16 NOTE — PROGRESS NOTES
Daily Note     Today's date: 2018  Patient name: Claudio Sanchez  : 1965  MRN: [id number]  Referring provider: Danie Bean MD  Dx:   Encounter Diagnosis     ICD-10-CM    1  Pain in right hip M25 551                   Subjective: Pt reports that she is still getting soreness through R medial quad, however overall feels like she is improving  Objective: Pt able to sit<>stand from level chair height w/ R LE min anterior to L  Shows good form single airex w/ equal WB        Precautions: posterior hip precautions     Specialty Daily Treatment Diary     Manual  7/2 7/5 7/10  7/12 7/16   LAD        DTM and TPR  The stick to L ITB and lateral hip x 5 min  x5 mins to same area X 10 min to right adductors, quads   x5 min w/ stick to R medial quad       Manual quad stretch prone 3 x 30 sec         Manual stretch adductors 5 x 10 sec                  Exercise Diary  7 8 9   10 11   PROM x3-4 mins  x2-3 mins  x2-3 mins x2-3 mins    HS stretching 3x30 sec holds, ITB and TFL stretch 3x30 on R Self sciatic nerve floss x 15 B  Manual HS, HF stretching 3x30 sec holds each for R side Manual HS, HF stretching 3x30 sec holds each for R side   glute stretching   Prone HF stretch 2x30 sec  3x30 sec holds  3x30 sec holds    SLR 2x10 2x10 B 2 x 10   3x10   Add sq  Prone hip ext 3x10 3 x 10      bridging 3x10 w/ ad sq 3x10 3 x 10  3x10 on peanut 3x10 regular    Heel raises        gastroc stretching        Sit<>stand  W/ YTB around knees from airex 3x10  From airex, focus on ecc x 10, from level chair height 2x10 From airex x10, from level chair height x 20 total working towards equal WB   Mini squats W/ YTB around toes 3x10 TRX strap squats 3x10, focus on hip drive  W/ straps [id number], focus on ecc    abd walking YTB 50'x8, monster walks 50'x6 YTB 50'x8-10  GTB 50'x8, monster walk 50'x8 RTB 50'x10, monster walks 50'x10   Hip flexion Resisted YTB hip flexion 2x10 at bar  2x10                       6" 2x10 BOSU step ups 3x10  8" step ups min UE support 3x10 on R  8" step ups min UE support 3x10 on R      4" glute med taps 3x10  4" glute med taps 3x10 on R       Pt instructed in prone quad stretch with SOS       Exaggerated gait in // bars x 3 mins  Stance hip flex, ext, abd w 5 sec hold  2x 10 ea         Bent knee fallout 2 x 10      Stationary bike Stationary bike pre x 7 min no resistance  Pre x 7 min, lvl 3 Pre x 10 min  Pre x 7 min, lvl 3 Pre x 8 min, lvl 3       Modalities 7 8 9  10 11   Heat pre no no No  no no   Ice post  no no No  no no                   Assessment: Tolerated treatment well  Patient demonstrated fatigue post treatment and would benefit from continued PT  Pt continues to show improving strength, fluidity of gait, and control over available functional ROM  Notes fatigue but no pain by end and is appropriate for continued progressions as able  Plan: Continue per plan of care

## 2018-07-18 ENCOUNTER — OFFICE VISIT (OUTPATIENT)
Dept: PHYSICAL THERAPY | Facility: CLINIC | Age: 53
End: 2018-07-18
Payer: COMMERCIAL

## 2018-07-18 DIAGNOSIS — M25.551 PAIN IN RIGHT HIP: Primary | ICD-10-CM

## 2018-07-18 PROCEDURE — 97530 THERAPEUTIC ACTIVITIES: CPT

## 2018-07-18 PROCEDURE — 97110 THERAPEUTIC EXERCISES: CPT

## 2018-07-18 NOTE — PROGRESS NOTES
Daily Note     Today's date: 2018  Patient name: Zach Gsaton  : 1965  MRN: [id number]  Referring provider: Amadou Venegas MD  Dx:   Encounter Diagnosis     ICD-10-CM    1  Pain in right hip M25 551                   Subjective: Pt notes continued discomfort and pain through R medial thigh/quad  Pain is getting slightly worse and affecting her walking, per pt report  She notes that she feels hip is getting much better overall, just this lingering pain in thigh is causing issues  Objective: Mod TTP through R medial quad, responds well to DTM  Requires cueing for proper form and pacing w/ quad and HF stretching but corrects well       Precautions: posterior hip precautions     Specialty Daily Treatment Diary     Manual    7/10  7/12 7/16   LAD x2 min total       DTM and TPR  x5 mins to R medial quad w/ "the stick"   x3 mins manual  X 10 min to right adductors, quads   x5 min w/ stick to R medial quad       Manual quad stretch prone 3 x 30 sec         Manual stretch adductors 5 x 10 sec                  Exercise Diary  12  9   10 11   PROM x2-3 mins    x2-3 mins x2-3 mins    HS stretching 3x30 sec holds    Manual HS, HF stretching 3x30 sec holds each for R side Manual HS, HF stretching 3x30 sec holds each for R side   glute stretching     3x30 sec holds  3x30 sec holds    SLR D2 hip flexion PNF 3x10 on R  2 x 10   3x10   Add sq   3 x 10      bridging reviewed  3 x 10  3x10 on peanut 3x10 regular    Heel raises        gastroc stretching Hip abd isos into pball on wall 3 sec hold 2x10 B        Sit<>stand    From airex, focus on ecc x 10, from level chair height 2x10 From airex x10, from level chair height x 20 total working towards equal WB   Mini squats W/ TRX straps 3x10    W/ straps 3x10-15, focus on ecc    abd walking GTB abd walking 50'x8-10, GTB monster walks 50'x8-10   GTB 50'x8, monster walk 50'x8 RTB 50'x10, monster walks 50'x10   Hip flexion LAQ seated x 30 on R 8" step ups min UE support 3x10 on R    8" step ups min UE support 3x10 on R  8" step ups min UE support 3x10 on R        4" glute med taps 3x10 on R     Instruction in half kneel HF and quad stretch 3 ways x 3-4 mins   Pt instructed in prone quad stretch with SOS         Stance hip flex, ext, abd w 5 sec hold  2x 10 ea         Bent knee fallout 2 x 10      Stationary bike Pre x 8 min lvl 3   Pre x 10 min  Pre x 7 min, lvl 3 Pre x 8 min, lvl 3       Modalities 12  9  10 11   Heat pre no  No  no no   Ice post  no  No  no no                 Assessment: Tolerated treatment well  Patient demonstrated fatigue post treatment, exhibited good technique with therapeutic exercises and would benefit from continued PT  Shows improving form and control throughout all exercises despite pain in medial R thigh  Does well to ambulate w/ solid mechanics despite pain  Hip strength and control is improving as evidenced by ability to perform D2 flexion in supine w/ good control  Does well w/ hip flexor and quad stretching and will add these into home program       Plan: Continue per plan of care   check stretching, LAQ or knee ext machine

## 2018-07-23 ENCOUNTER — APPOINTMENT (OUTPATIENT)
Dept: PHYSICAL THERAPY | Facility: CLINIC | Age: 53
End: 2018-07-23
Payer: COMMERCIAL

## 2018-07-24 ENCOUNTER — APPOINTMENT (OUTPATIENT)
Dept: PHYSICAL THERAPY | Facility: CLINIC | Age: 53
End: 2018-07-24
Payer: COMMERCIAL

## 2018-07-25 ENCOUNTER — APPOINTMENT (OUTPATIENT)
Dept: PHYSICAL THERAPY | Facility: CLINIC | Age: 53
End: 2018-07-25
Payer: COMMERCIAL

## 2018-07-26 ENCOUNTER — OFFICE VISIT (OUTPATIENT)
Dept: PHYSICAL THERAPY | Facility: CLINIC | Age: 53
End: 2018-07-26
Payer: COMMERCIAL

## 2018-07-26 DIAGNOSIS — M25.551 PAIN IN RIGHT HIP: Primary | ICD-10-CM

## 2018-07-26 PROCEDURE — 97140 MANUAL THERAPY 1/> REGIONS: CPT

## 2018-07-26 PROCEDURE — 97112 NEUROMUSCULAR REEDUCATION: CPT

## 2018-07-26 PROCEDURE — 97110 THERAPEUTIC EXERCISES: CPT

## 2018-07-26 NOTE — PROGRESS NOTES
Daily Note     Today's date: 2018  Patient name: Brittany Salazar  : 1965  MRN: [id number]  Referring provider: Joyce Ayala MD  Dx:   Encounter Diagnosis     ICD-10-CM    1  Pain in right hip M25 551                   Subjective: Pt reports that R anterior medial thigh continues to be very sore and tight  Low back has been bothersome lately  Hip feels great        Objective: See treatment diary below      Precautions: posterior hip precautions     Specialty Daily Treatment Diary     Manual     LAD x2 min total x2 min      DTM and TPR  x5 mins to R medial quad w/ "the stick"   x3 mins manual x4 mins to R medial quad, x4 mins w/ the stick to R ITB and lateral musculature   x5 min w/ stick to R medial quad                                Exercise Diary  12 13  10 11   PROM x2-3 mins    x2-3 mins x2-3 mins    HS stretching 3x30 sec holds  Self 3x30 sec   Manual HS, HF stretching 3x30 sec holds each for R side Manual HS, HF stretching 3x30 sec holds each for R side   glute stretching     3x30 sec holds  3x30 sec holds    SLR D2 hip flexion PNF 3x10 on R    3x10   Add sq  Stretching for quad/HF education and review x 4-5 mins      bridging reviewed W/ ad sq 3x10  3x10 on peanut 3x10 regular    Heel raises        gastroc stretching Hip abd isos into pball on wall 3 sec hold 2x10 B        Sit<>stand    From airex, focus on ecc x 10, from level chair height 2x10 From airex x10, from level chair height x 20 total working towards equal WB   Mini squats W/ TRX straps 3x10  W/ straps 3x10  W/ straps 3x10-15, focus on ecc    abd walking GTB abd walking 50'x8-10, GTB monster walks 50'x8-10   GTB 50'x8, monster walk 50'x8 RTB 50'x10, monster walks 50'x10   Hip flexion LAQ seated x 30 on R         BOSU ball lateral and anterior lunges 3x10 each               8" step ups min UE support 3x10 on R    8" step ups min UE support 3x10 on R  8" step ups min UE support 3x10 on R        4" glute med taps 3x10 on R     Instruction in half kneel HF and quad stretch 3 ways x 3-4 mins  Reviewed                       Stationary bike Pre x 8 min lvl 3  Pre x 8 min lvl 3 w/ upright bike  Pre x 7 min, lvl 3 Pre x 8 min, lvl 3       Modalities 12 13  10 11   Heat pre no no  no no   Ice post  no no  no no                 Assessment: Tolerated treatment well  Patient demonstrated fatigue post treatment and would benefit from continued PT  Pt does well w/ introduction of BOSU ball activities, notes fatigue but responds well to cueing to prevent compensations  Appropriate for higher level stability challenges as able  Plan: Continue per plan of care   tband core and hip stab

## 2018-08-01 ENCOUNTER — OFFICE VISIT (OUTPATIENT)
Dept: PHYSICAL THERAPY | Facility: CLINIC | Age: 53
End: 2018-08-01
Payer: COMMERCIAL

## 2018-08-01 DIAGNOSIS — M25.551 PAIN IN RIGHT HIP: Primary | ICD-10-CM

## 2018-08-01 PROCEDURE — 97112 NEUROMUSCULAR REEDUCATION: CPT

## 2018-08-01 PROCEDURE — 97140 MANUAL THERAPY 1/> REGIONS: CPT

## 2018-08-01 NOTE — PROGRESS NOTES
Daily Note     Today's date: 2018  Patient name: Laurette Babinski  : 1965  MRN: [id number]  Referring provider: Yeimy Hu MD  Dx:   Encounter Diagnosis     ICD-10-CM    1  Pain in right hip M25 551                   Subjective: Pt reports that she is feeling better over all in all regards  Getting less discomfort into R medial thigh  Has been having some low back pain, notes that this has been ongoing for years and would like to learn some core exercises to address this  Objective: Requires verbal and tactile cueing to help engage core properly during TrA stabilization training but corrects well  TRX squats are performed w/ excellent form and control today, no pain        Precautions: posterior hip precautions     Specialty Daily Treatment Diary     Manual     LAD x2 min total x2 min x2 min holds     DTM and TPR  x5 mins to R medial quad w/ "the stick"   x3 mins manual x4 mins to R medial quad, x4 mins w/ the stick to R ITB and lateral musculature no  x5 min w/ stick to R medial quad       R side lumbar UPAs grade II/IIIin prone x2-3 mins        L S/l rotatioanl PPIVMs x 30 sec                  Exercise Diary  12 13 14  11   PROM x2-3 mins     x2-3 mins    HS stretching 3x30 sec holds  Self 3x30 sec  Manual 3xc30 sec holds, s/l HF stretch 3x30 sec holds   Manual HS, HF stretching 3x30 sec holds each for R side   glute stretching    Self cross leg stretch review x 2-3 mins   3x30 sec holds    SLR D2 hip flexion PNF 3x10 on R    3x10   Add sq  Stretching for quad/HF education and review x 4-5 mins      bridging reviewed W/ ad sq 3x10 3x10  3x10 regular    Heel raises        gastroc stretching Hip abd isos into pball on wall 3 sec hold 2x10 B   TrA single leg ext 2x10 B, 90/90 taps 2x10 total     Sit<>stand     From airex x10, from level chair height x 20 total working towards equal WB   Mini squats W/ TRX straps 3x10  W/ straps 3x10 3x10 total     abd walking GTB abd walking 50'x8-10, GTB monster walks 50'x8-10    RTB 50'x10, monster walks 50'x10   Hip flexion LAQ seated x 30 on R  GTB pallof press and lateral walk outs 2x10 B        BOSU ball lateral and anterior lunges 3x10 each Review               8" step ups min UE support 3x10 on R     8" step ups min UE support 3x10 on R             Instruction in half kneel HF and quad stretch 3 ways x 3-4 mins  Reviewed  LIZY x 2-3 mins, PPE review                      Stationary bike Pre x 8 min lvl 3  Pre x 8 min lvl 3 w/ upright bike no  Pre x 8 min, lvl 3       Modalities 12 13 14  11   Heat pre no no no  no   Ice post  no no no  no               Assessment: Tolerated treatment well  Patient demonstrated fatigue post treatment and would benefit from continued PT  Pt tolerates addition of core stabilization training well, noting fatigue by end  Did not onset of R sided low back discomfort at end of pallof press that was made no worse w/ other exercises  Careful attention paid to form to prevent exacerbation  UPAs added w/ good success, educated in PPE and LIZY for home  Verbalizes and shows understanding  Plan: Continue per plan of care   extension for low back, single leg squats w/ UE support, abd walking, core rotations

## 2018-08-07 ENCOUNTER — OFFICE VISIT (OUTPATIENT)
Dept: PHYSICAL THERAPY | Facility: CLINIC | Age: 53
End: 2018-08-07
Payer: COMMERCIAL

## 2018-08-07 DIAGNOSIS — M25.551 PAIN IN RIGHT HIP: Primary | ICD-10-CM

## 2018-08-07 PROCEDURE — 97112 NEUROMUSCULAR REEDUCATION: CPT

## 2018-08-07 PROCEDURE — G8978 MOBILITY CURRENT STATUS: HCPCS

## 2018-08-07 PROCEDURE — 97140 MANUAL THERAPY 1/> REGIONS: CPT

## 2018-08-07 PROCEDURE — G8979 MOBILITY GOAL STATUS: HCPCS

## 2018-08-07 PROCEDURE — 97110 THERAPEUTIC EXERCISES: CPT

## 2018-08-07 NOTE — LETTER
2018    Rafael Luna MD  [address] [facility]  [address]  [address] [place]    Patient: Bobbi Gonzales   YOB: 1965   Date of Visit: 2018     Encounter Diagnosis     ICD-10-CM    1  Pain in right hip M25 551        Dear Dr Jackelyn Crowley:    Please review the attached Plan of Care from Bobbi Gonzales recent visit  Update on progress! If you have any questions or concerns, please don't hesitate to call  Sincerely,    Royal Sampson, PT      Referring Provider:      I certify that I have read the below Plan of Care and certify the need for these services furnished under this plan of treatment while under my care  Rafael Luna MD  [address] [facility]  [address]: [phone]          Progress Note     Today's date: 2018  Patient name: Bobbi Gonzales  : 1965  MRN: [id number]  Referring provider: Princess Little MD  Dx:   Encounter Diagnosis     ICD-10-CM    1  Pain in right hip M25 551                   Subjective: Pt reports that low back is bothering her but hip is feeling better overall  Would like to get quad stronger as she feels that this is still weak  She notes that her hip is feeling great, will be leaving for [place] next week and wants to learn more strategies for managing low back pain  Functional update to follow:      Objective:      Objective: Hip flexion ROM to 110-115 deg actively w/o pain, abd to ~45 w/o pain, ext to ~10-15 w/o pain  Hip MMT 4/5 for flexion, ext and abduction, 4/5 for ext, IR, ER, Core: 2+/5  Gait mechanics have improved, min Trendelenburg w/o trunk lean, min toe in, improved pace and not antalgic  Pain in R hip 0-4/10    Short term goals (3 weeks):  1) Pt will improve R hip ROM for flexion, abduction, and IR/ER to expected limits per restrictions w/o pain  - achieved  2) Pt will improve R hip MMT testing by at least 1/3 grade   - achieved   3) Pt will report pain at worse <5/10  - achieved   4) Pt will initiate and progress HEP w/ special emphasis on functional ROM and strength  - achieved     Long term goals (6 weeks) - extended 3 weeks  1) Pt will improve FOTO to at least 65  - no progress  2) Pt will stand and ambulate for durations greater than 30 min w/ normalized gait mechanics and no pain  - achieved  3) Pt will be functionally unlimited w/ stairs in order to reach second floor of home w/o issue  - achieved  4) Pt will be independent and compliant w/ HEP in order to maximize functional benefit of skilled PT following d/c  - progressed/acheived     New Long Term Goals (6 weeks) - extended two weeks  1) Pt will be functionally unlimited by hip w/ stairs, ambulation, transfers, dressing, and age appropriate household activities  - progressed  2) Pt will be (I) w/ final HEP  - progressed/achieved     New Goals  Short term 2 weeks  1) Pt will perform 10 functional squats w/o cueing or compensation, proper mechanics, and no pain  2) Pt will further improve B LE and core strength by 1/3 grade MMT       Long Term 4 weeks  1) Pt will be functionally unlimited by hip w/ stairs, ambulation, transfers, dressing, and age appropriate household activities  2) Pt will improve FOTO by an additional 10 points  3) Pt will be (I) and complaint w/ final HEP      Precautions: posterior hip precautions     Specialty Daily Treatment Diary     Manual  7/18 7/26 8/1 8/7    LAD x2 min total x2 min x2 min holds 2x2 min holds     DTM and TPR  x5 mins to R medial quad w/ "the stick"   x3 mins manual x4 mins to R medial quad, x4 mins w/ the stick to R ITB and lateral musculature no        R side lumbar UPAs grade II/IIIin prone x2-3 mins R side lumbar UPAs grade II/IIIin prone x4 mins       L S/l rotatioanl PPIVMs x 30 sec                  Exercise Diary  12 13 14 15    PROM x2-3 mins        HS stretching 3x30 sec holds  Self 3x30 sec  Manual 3xc30 sec holds, s/l HF stretch 3x30 sec holds  Manual 3xc30 sec holds, s/l HF stretch 3x30 sec holds     glute stretching    Self cross leg stretch review x 2-3 mins      SLR D2 hip flexion PNF 3x10 on R       Add sq  Stretching for quad/HF education and review x 4-5 mins      bridging reviewed W/ ad sq 3x10 3x10     Heel raises        gastroc stretching Hip abd isos into pball on wall 3 sec hold 2x10 B   TrA single leg ext 2x10 B, 90/90 taps 2x10 total TrA single leg ext 2x10 B, 90/90 taps 2x10    Sit<>stand        Mini squats W/ TRX straps 3x10  W/ straps 3x10 3x10 total Review     abd walking GTB abd walking 50'x8-10, GTB monster walks 50'x8-10       Hip flexion LAQ seated x 30 on R  GTB pallof press and lateral walk outs 2x10 B        BOSU ball lateral and anterior lunges 3x10 each Review  Plank progression and review x 5 mins              8" step ups min UE support 3x10 on R    S/l open books x 15 B              Instruction in half kneel HF and quad stretch 3 ways x 3-4 mins  Reviewed  LIZY x 2-3 mins, PPE review  LIZY x 2-3 mins, PPE x10-15        SLS w/ march x 2 mins             Stationary bike Pre x 8 min lvl 3  Pre x 8 min lvl 3 w/ upright bike no no        Modalities 12 13 14 15    Heat pre no no no no    Ice post  no no no no                  Assessment: Tolerated treatment well  Patient demonstrated fatigue post treatment and would benefit from continued PT  Pt will continue to benefit from skilled PT, she has made significant progress towards all goals at this point  Hip and core MMT is improving, as are functional gait and transfer mechanics  She is appropriate for continue services w/ special emphasis on single leg strength, functional lifting and gait training, and higher level core stability  Progress as able  Plan: Continue per plan of care   single leg stance and putting pants on

## 2018-08-07 NOTE — PROGRESS NOTES
Progress Note     Today's date: 2018  Patient name: Phi Barrett  : 1965  MRN: [id number]  Referring provider: Darlene Jones MD  Dx:   Encounter Diagnosis     ICD-10-CM    1  Pain in right hip M25 551                   Subjective: Pt reports that low back is bothering her but hip is feeling better overall  Would like to get quad stronger as she feels that this is still weak  She notes that her hip is feeling great, will be leaving for [place] next week and wants to learn more strategies for managing low back pain  Functional update to follow:      Objective:      Objective: Hip flexion ROM to 110-115 deg actively w/o pain, abd to ~45 w/o pain, ext to ~10-15 w/o pain  Hip MMT 4/5 for flexion, ext and abduction, 4/5 for ext, IR, ER, Core: 2+/5  Gait mechanics have improved, min Trendelenburg w/o trunk lean, min toe in, improved pace and not antalgic  Pain in R hip 0-4/10    Short term goals (3 weeks):  1) Pt will improve R hip ROM for flexion, abduction, and IR/ER to expected limits per restrictions w/o pain  - achieved  2) Pt will improve R hip MMT testing by at least 1/3 grade  - achieved   3) Pt will report pain at worse <5/10  - achieved   4) Pt will initiate and progress HEP w/ special emphasis on functional ROM and strength  - achieved     Long term goals (6 weeks) - extended 3 weeks  1) Pt will improve FOTO to at least 65  - no progress  2) Pt will stand and ambulate for durations greater than 30 min w/ normalized gait mechanics and no pain  - achieved  3) Pt will be functionally unlimited w/ stairs in order to reach second floor of home w/o issue  - achieved  4) Pt will be independent and compliant w/ HEP in order to maximize functional benefit of skilled PT following d/c  - progressed/acheived     New Long Term Goals (6 weeks) - extended two weeks  1) Pt will be functionally unlimited by hip w/ stairs, ambulation, transfers, dressing, and age appropriate household activities   - progressed  2) Pt will be (I) w/ final HEP  - progressed/achieved     New Goals  Short term 2 weeks  1) Pt will perform 10 functional squats w/o cueing or compensation, proper mechanics, and no pain  2) Pt will further improve B LE and core strength by 1/3 grade MMT       Long Term 4 weeks  1) Pt will be functionally unlimited by hip w/ stairs, ambulation, transfers, dressing, and age appropriate household activities  2) Pt will improve FOTO by an additional 10 points  3) Pt will be (I) and complaint w/ final HEP      Precautions: posterior hip precautions     Specialty Daily Treatment Diary     Manual  7/18 7/26 8/1 8/7    LAD x2 min total x2 min x2 min holds 2x2 min holds     DTM and TPR  x5 mins to R medial quad w/ "the stick"   x3 mins manual x4 mins to R medial quad, x4 mins w/ the stick to R ITB and lateral musculature no        R side lumbar UPAs grade II/IIIin prone x2-3 mins R side lumbar UPAs grade II/IIIin prone x4 mins       L S/l rotatioanl PPIVMs x 30 sec                  Exercise Diary  12 13 14 15    PROM x2-3 mins        HS stretching 3x30 sec holds  Self 3x30 sec  Manual 3xc30 sec holds, s/l HF stretch 3x30 sec holds  Manual 3xc30 sec holds, s/l HF stretch 3x30 sec holds     glute stretching    Self cross leg stretch review x 2-3 mins      SLR D2 hip flexion PNF 3x10 on R       Add sq  Stretching for quad/HF education and review x 4-5 mins      bridging reviewed W/ ad sq 3x10 3x10     Heel raises        gastroc stretching Hip abd isos into pball on wall 3 sec hold 2x10 B   TrA single leg ext 2x10 B, 90/90 taps 2x10 total TrA single leg ext 2x10 B, 90/90 taps 2x10    Sit<>stand        Mini squats W/ TRX straps 3x10  W/ straps 3x10 3x10 total Review     abd walking GTB abd walking 50'x8-10, GTB monster walks 50'x8-10       Hip flexion LAQ seated x 30 on R  GTB pallof press and lateral walk outs 2x10 B        BOSU ball lateral and anterior lunges 3x10 each Review  Plank progression and review x 5 mins 8" step ups min UE support 3x10 on R    S/l open books x 15 B              Instruction in half kneel HF and quad stretch 3 ways x 3-4 mins  Reviewed  LIZY x 2-3 mins, PPE review  LIZY x 2-3 mins, PPE x10-15        SLS w/ march x 2 mins             Stationary bike Pre x 8 min lvl 3  Pre x 8 min lvl 3 w/ upright bike no no        Modalities 12 13 14 15    Heat pre no no no no    Ice post  no no no no                  Assessment: Tolerated treatment well  Patient demonstrated fatigue post treatment and would benefit from continued PT  Pt will continue to benefit from skilled PT, she has made significant progress towards all goals at this point  Hip and core MMT is improving, as are functional gait and transfer mechanics  She is appropriate for continue services w/ special emphasis on single leg strength, functional lifting and gait training, and higher level core stability  Progress as able  Plan: Continue per plan of care   single leg stance and putting pants on

## 2018-08-07 NOTE — LETTER
2018    Elease Bamberger, MD  [address]  [address]  [address] [place]    Patient: Ibis Miranda   YOB: 1965   Date of Visit: 2018     Encounter Diagnosis     ICD-10-CM    1  Pain in right hip M25 551        Dear Dr Black Miller:    Please review the attached Plan of Care from Ibis Miranda recent visit  Update on progress! If you have any questions or concerns, please don't hesitate to call  Sincerely,    Marcela Lindsey PT      Referring Provider:      I certify that I have read the below Plan of Care and certify the need for these services furnished under this plan of treatment while under my care  Elease Bamberger, MD  [address] [facility]  [address]: [phone]          Progress Note     Today's date: 2018  Patient name: Ibis Miranda  : 1965  MRN: [id number]  Referring provider: Aislinn Ly MD  Dx:   Encounter Diagnosis     ICD-10-CM    1  Pain in right hip M25 551                   Subjective: Pt reports that low back is bothering her but hip is feeling better overall  Would like to get quad stronger as she feels that this is still weak  She notes that her hip is feeling great, will be leaving for [place] next week and wants to learn more strategies for managing low back pain  Functional update to follow:      Objective:      Objective: Hip flexion ROM to 110-115 deg actively w/o pain, abd to ~45 w/o pain, ext to ~10-15 w/o pain  Hip MMT 4/5 for flexion, ext and abduction, 4/5 for ext, IR, ER, Core: 2+/5  Gait mechanics have improved, min Trendelenburg w/o trunk lean, min toe in, improved pace and not antalgic  Pain in R hip 0-4/10    Short term goals (3 weeks):  1) Pt will improve R hip ROM for flexion, abduction, and IR/ER to expected limits per restrictions w/o pain  - achieved  2) Pt will improve R hip MMT testing by at least 1/3 grade   - achieved   3) Pt will report pain at worse <5/10  - achieved   4) Pt will initiate and progress HEP w/ special emphasis on functional ROM and strength  - achieved     Long term goals (6 weeks) - extended 3 weeks  1) Pt will improve FOTO to at least 65  - no progress  2) Pt will stand and ambulate for durations greater than 30 min w/ normalized gait mechanics and no pain  - achieved  3) Pt will be functionally unlimited w/ stairs in order to reach second floor of home w/o issue  - achieved  4) Pt will be independent and compliant w/ HEP in order to maximize functional benefit of skilled PT following d/c  - progressed/acheived     New Long Term Goals (6 weeks) - extended two weeks  1) Pt will be functionally unlimited by hip w/ stairs, ambulation, transfers, dressing, and age appropriate household activities  - progressed  2) Pt will be (I) w/ final HEP  - progressed/achieved     New Goals  Short term 2 weeks  1) Pt will perform 10 functional squats w/o cueing or compensation, proper mechanics, and no pain  2) Pt will further improve B LE and core strength by 1/3 grade MMT       Long Term 4 weeks  1) Pt will be functionally unlimited by hip w/ stairs, ambulation, transfers, dressing, and age appropriate household activities  2) Pt will improve FOTO by an additional 10 points  3) Pt will be (I) and complaint w/ final HEP      Precautions: posterior hip precautions     Specialty Daily Treatment Diary     Manual  7/18 7/26 8/1 8/7    LAD x2 min total x2 min x2 min holds 2x2 min holds     DTM and TPR  x5 mins to R medial quad w/ "the stick"   x3 mins manual x4 mins to R medial quad, x4 mins w/ the stick to R ITB and lateral musculature no        R side lumbar UPAs grade II/IIIin prone x2-3 mins R side lumbar UPAs grade II/IIIin prone x4 mins       L S/l rotatioanl PPIVMs x 30 sec                  Exercise Diary  12 13 14 15    PROM x2-3 mins        HS stretching 3x30 sec holds  Self 3x30 sec  Manual 3xc30 sec holds, s/l HF stretch 3x30 sec holds  Manual 3xc30 sec holds, s/l HF stretch 3x30 sec holds     glute stretching    Self cross leg stretch review x 2-3 mins      SLR D2 hip flexion PNF 3x10 on R       Add sq  Stretching for quad/HF education and review x 4-5 mins      bridging reviewed W/ ad sq 3x10 3x10     Heel raises        gastroc stretching Hip abd isos into pball on wall 3 sec hold 2x10 B   TrA single leg ext 2x10 B, 90/90 taps 2x10 total TrA single leg ext 2x10 B, 90/90 taps 2x10    Sit<>stand        Mini squats W/ TRX straps 3x10  W/ straps 3x10 3x10 total Review     abd walking GTB abd walking 50'x8-10, GTB monster walks 50'x8-10       Hip flexion LAQ seated x 30 on R  GTB pallof press and lateral walk outs 2x10 B        BOSU ball lateral and anterior lunges 3x10 each Review  Plank progression and review x 5 mins              8" step ups min UE support 3x10 on R    S/l open books x 15 B              Instruction in half kneel HF and quad stretch 3 ways x 3-4 mins  Reviewed  LIZY x 2-3 mins, PPE review  LIZY x 2-3 mins, PPE x10-15        SLS w/ march x 2 mins             Stationary bike Pre x 8 min lvl 3  Pre x 8 min lvl 3 w/ upright bike no no        Modalities 12 13 14 15    Heat pre no no no no    Ice post  no no no no                  Assessment: Tolerated treatment well  Patient demonstrated fatigue post treatment and would benefit from continued PT  Pt will continue to benefit from skilled PT, she has made significant progress towards all goals at this point  Hip and core MMT is improving, as are functional gait and transfer mechanics  She is appropriate for continue services w/ special emphasis on single leg strength, functional lifting and gait training, and higher level core stability  Progress as able  Plan: Continue per plan of care   single leg stance and putting pants on

## 2018-08-09 ENCOUNTER — OFFICE VISIT (OUTPATIENT)
Dept: PHYSICAL THERAPY | Facility: CLINIC | Age: 53
End: 2018-08-09
Payer: COMMERCIAL

## 2018-08-09 DIAGNOSIS — M25.551 PAIN IN RIGHT HIP: Primary | ICD-10-CM

## 2018-08-09 PROCEDURE — 97110 THERAPEUTIC EXERCISES: CPT

## 2018-08-09 PROCEDURE — 97112 NEUROMUSCULAR REEDUCATION: CPT

## 2018-08-09 NOTE — PROGRESS NOTES
Daily Note     Today's date: 2018  Patient name: Carol Reich  : 1965  MRN: [id number]  Referring provider: Mo Lees MD  Dx:   Encounter Diagnosis     ICD-10-CM    1  Pain in right hip M25 551                   Subjective: Pt reports that she is sore today from walking around [facility] yesterday  Felt good after last session  Has been doing back exercises on own  Objective: Shows equal hip drive and muscle activation during TRX squats  Pt requires intermittent min UE support during BOSU ball activities         Precautions: posterior hip precautions     Specialty Daily Treatment Diary     Manual     LAD x2 min total x2 min x2 min holds 2x2 min holds  LAD on R 2x2 min holds    DTM and TPR  x5 mins to R medial quad w/ "the stick"   x3 mins manual x4 mins to R medial quad, x4 mins w/ the stick to R ITB and lateral musculature no        R side lumbar UPAs grade II/IIIin prone x2-3 mins R side lumbar UPAs grade II/IIIin prone x4 mins       L S/l rotatioanl PPIVMs x 30 sec                  Exercise Diary  12 13 14 15 16   PROM x2-3 mins        HS stretching 3x30 sec holds  Self 3x30 sec  Manual 3xc30 sec holds, s/l HF stretch 3x30 sec holds  Manual 3x30 sec holds, s/l HF stretch 3x30 sec holds  Manual 3x30 sec holds for HS and HF on R    glute stretching    Self cross leg stretch review x 2-3 mins   Self 3x30 sec holds    SLR D2 hip flexion PNF 3x10 on R       Add sq  Stretching for quad/HF education and review x 4-5 mins   Add stretch 2x30 sec holds    bridging reviewed W/ ad sq 3x10 3x10     Heel raises        gastroc stretching Hip abd isos into pball on wall 3 sec hold 2x10 B   TrA single leg ext 2x10 B, 90/90 taps 2x10 total TrA single leg ext 2x10 B, 90/90 taps 2x10 Reviewed    Sit<>stand        Mini squats W/ TRX straps 3x10  W/ straps 3x10 3x10 total Review  TRX squats 3x10 total    abd walking GTB abd walking 50'x8-10, GTB monster walks 50'x8-10       Hip flexion LAQ seated x 30 on R  GTB pallof press and lateral walk outs 2x10 B   Review      BOSU ball lateral and anterior lunges 3x10 each Review  Plank progression and review x 5 mins          SLS 2# ball toss rebounder x 3-4 mins     8" step ups min UE support 3x10 on R    S/l open books x 15 B          Marching on airex 3x10 B    Instruction in half kneel HF and quad stretch 3 ways x 3-4 mins  Reviewed  LIZY x 2-3 mins, PPE review  LIZY x 2-3 mins, PPE x10-15        SLS w/ march x 2 mins          BOSU lateral and anterior step overs 3x10-12 B    Stationary bike Pre x 8 min lvl 3  Pre x 8 min lvl 3 w/ upright bike no no no       Modalities 12 13 14 15 16   Heat pre no no no no no   Ice post  no no no no no                 Assessment: Tolerated treatment well  Patient demonstrated fatigue post treatment and would benefit from continued PT  Does very well w/ SLS progressions in an effort to make LE dressing and balance more efficient  Quick fatigue but good form noted w/ BOSU ball activities  Appropriate for higher level progressions after her return from trip to [place]  Plan: Continue per plan of care   side stepping, DF and HF marching w/ tband

## 2018-08-21 ENCOUNTER — APPOINTMENT (OUTPATIENT)
Dept: PHYSICAL THERAPY | Facility: CLINIC | Age: 53
End: 2018-08-21
Payer: COMMERCIAL

## 2018-08-23 ENCOUNTER — OFFICE VISIT (OUTPATIENT)
Dept: PHYSICAL THERAPY | Facility: CLINIC | Age: 53
End: 2018-08-23
Payer: COMMERCIAL

## 2018-08-23 DIAGNOSIS — M25.551 PAIN IN RIGHT HIP: Primary | ICD-10-CM

## 2018-08-23 PROCEDURE — 97110 THERAPEUTIC EXERCISES: CPT

## 2018-08-23 PROCEDURE — 97112 NEUROMUSCULAR REEDUCATION: CPT

## 2018-08-23 NOTE — PROGRESS NOTES
Daily Note     Today's date: 2018  Patient name: Ibis Miranda  : 1965  MRN: [id number]  Referring provider: Aislinn Ly MD  Dx:   Encounter Diagnosis     ICD-10-CM    1  Pain in right hip M25 551                   Subjective: Pt reports that trip to [facility] went well  She had to use wheel chair at times but notes that she did lots of walking and hip was okay  Feels tight today  Unable to attend last session as she had sinus infection following vacation  No new complaints today  Objective: Performs TRX squats w/ only min support from straps today  Requires mod support w/ side lunges         Precautions: posterior hip precautions     Specialty Daily Treatment Diary     Manual     LAD 2x2 min holds   x2 min holds 2x2 min holds  LAD on R 2x2 min holds    DTM and TPR    no        R side lumbar UPAs grade II/IIIin prone x2-3 mins R side lumbar UPAs grade II/IIIin prone x4 mins       L S/l rotatioanl PPIVMs x 30 sec                  Exercise Diary  17  14 15 16   PROM        HS stretching Manual 3x30 sec holds, self sciatic nerve floss x15 total  Manual 3xc30 sec holds, s/l HF stretch 3x30 sec holds  Manual 3x30 sec holds, s/l HF stretch 3x30 sec holds  Manual 3x30 sec holds for HS and HF on R    glute stretching    Self cross leg stretch review x 2-3 mins   Self 3x30 sec holds    SLR D2 hip flexion PNF 3x10 on R        Add sq     Add stretch 2x30 sec holds    bridging W/ GTB around knees 3x10   3x10     Heel raises        gastroc stretching   TrA single leg ext 2x10 B, 90/90 taps 2x10 total TrA single leg ext 2x10 B, 90/90 taps 2x10 Reviewed    Sit<>stand        Mini squats TRX squats and lateral lunges min/modA 3x10 each  3x10 total Review  TRX squats 3x10 total    abd walking        Hip flexion   GTB pallof press and lateral walk outs 2x10 B   Review       Review  Plank progression and review x 5 mins      Manual HF and quad stretching 3x30 sec holds for each     SLS 2# ball toss rebounder x 3-4 mins     GTB single leg fall outs 3x10 B    S/l open books x 15 B          Marching on airex 3x10 B      LIZY x 2-3 mins, PPE review  LIZY x 2-3 mins, PPE x10-15        SLS w/ march x 2 mins      Review of HEP and proper gait training x 3-4 mins    BOSU lateral and anterior step overs 3x10-12 B    Stationary bike   no no no       Modalities 17  14 15 16   Heat pre no  no no no   Ice post  no  no no no                 Assessment: Tolerated treatment well  Patient demonstrated fatigue post treatment and would benefit from continued PT  Does well w/ today's program, further progressions not added as this was first session in week and a half  Pt does well w/ higher level supine table exercises for hip stability  She responds well to manual techniques for stretching  She is appropriate for continued progressions as sx allow  Plan: Continue per plan of care   MATTHEW daniels, lat hip abd walking, standing hip flexion w/ band

## 2018-08-30 ENCOUNTER — OFFICE VISIT (OUTPATIENT)
Dept: PHYSICAL THERAPY | Facility: CLINIC | Age: 53
End: 2018-08-30
Payer: COMMERCIAL

## 2018-08-30 DIAGNOSIS — M25.551 PAIN IN RIGHT HIP: Primary | ICD-10-CM

## 2018-08-30 PROCEDURE — 97112 NEUROMUSCULAR REEDUCATION: CPT

## 2018-08-30 PROCEDURE — 97110 THERAPEUTIC EXERCISES: CPT

## 2018-08-30 NOTE — PROGRESS NOTES
Daily Note     Today's date: 2018  Patient name: Nishi Merrill  : 1965  MRN: [id number]  Referring provider: Kev Pagan MD  Dx:   Encounter Diagnosis     ICD-10-CM    1  Pain in right hip M25 551        Start Time: 935  Stop Time:   Total time in clinic (min): 35 minutes    Subjective: Pt states she is not able to put her sock & shoe on & not able to reach down to shave her right lower leg       Objective: Performs TRX squats w/ only min support from straps today  Requires mod support w/ side lunges         Precautions: posterior hip precautions     Specialty Daily Treatment Diary     Manual   8-18    LAD 2x2 min holds   x2 min holds - not today  2x2 min holds  LAD on R 2x2 min holds    DTM and TPR    no        R side lumbar UPAs grade II/IIIin prone x2-3 mins - not today  R side lumbar UPAs grade II/IIIin prone x4 mins       L S/l rotatioanl PPIVMs x 30 sec - not today                  Exercise Diary  17 18 19  16   PROM        HS stretching Manual 3x30 sec holds, self sciatic nerve floss x15 total  Manual 3xc30 sec holds, s/l HF stretch 3x30 sec holds & adductor stretch 3 x 30 sec  Manual 3x30 sec holds, s/l HF stretch 3x30 sec holds  Manual 3x30 sec holds for HS and HF on R    glute stretching    HS stretch seated with SOS 10 x 10 sec   Self 3x30 sec holds    SLR D2 hip flexion PNF 3x10 on R        Add sq   Lateral step ups 6 inch 2 x 10   Add stretch 2x30 sec holds    bridging W/ GTB around knees 3x10   3x10  - not today      Heel raises        gastroc stretching   TrA single leg ext 2x10 B, 90/90 taps 2x10 total - not today  TrA single leg ext 2x10 B, 90/90 taps 2x10 Reviewed    Sit<>stand        Mini squats TRX squats and lateral lunges min/modA 3x10 each  3x10 total Review  TRX squats 3x10 total    abd walking        Hip flexion   Hip flexor stretch on step 10 x 10 sec   Review       ER stretch - placing right heel on left knee 5 secx 10  Plank progression and review x 5 mins      Manual HF and quad stretching 3x30 sec holds for each   Piriformis stretch 3 x 30 sec   SLS 2# ball toss rebounder x 3-4 mins     GTB single leg fall outs 3x10 B   SK to chest using SOS   3 x 30 sec  S/l open books x 15 B        Bent knee fallout w 5 sec hold 2 x 10   Marching on airex 3x10 B      Quad stretch w SOS 3 x 30 sec  LIZY x 2-3 mins, PPE x10-15        SLS w/ march x 2 mins      Review of HEP and proper gait training x 3-4 mins    BOSU lateral and anterior step overs 3x10-12 B    Stationary bike   no no no       Modalities 17 18 14 15 16   Heat pre no no no no no   Ice post  no no no no no                     Assessment: Tolerated treatment well  Patient would benefit from continued PT; pt with good tolerance to RLE stretching education outlined in above exercise diary       Plan: Continue per plan of care   Pt to obtain a SOS to perform stretches above - currently using a theraband

## 2018-09-04 ENCOUNTER — OFFICE VISIT (OUTPATIENT)
Dept: PHYSICAL THERAPY | Facility: CLINIC | Age: 53
End: 2018-09-04
Payer: COMMERCIAL

## 2018-09-04 DIAGNOSIS — M25.551 PAIN IN RIGHT HIP: Primary | ICD-10-CM

## 2018-09-04 PROCEDURE — 97112 NEUROMUSCULAR REEDUCATION: CPT

## 2018-09-04 PROCEDURE — 97140 MANUAL THERAPY 1/> REGIONS: CPT

## 2018-09-04 NOTE — PROGRESS NOTES
Daily Note     Today's date: 2018  Patient name: Toyin Castaneda  : 1965  MRN: [id number]  Referring provider: Marylou Cuevas MD  Dx:   Encounter Diagnosis     ICD-10-CM    1  Pain in right hip M25 551                   Subjective: Pt reports that her R hip is tight today, feels that scar is really tight  Wants to work on motion of putting shoes on as this is very difficult         Objective: Pt shows improved foot over knee motion (simulating putting shoe on) post supine and seated work      Precautions: posterior hip precautions     Specialty Daily Treatment Diary     Manual  -    LAD 2x2 min holds   x2 min holds - not today  x2 min hold     DTM and TPR    no        R side lumbar UPAs grade II/IIIin prone x2-3 mins - not today         L S/l rotatioanl PPIVMs x 30 sec - not today  "the stick" to lateral hip x3 min        R piri release x3-4 mins        Exercise Diary  17 18 19 20    PROM        HS stretching Manual 3x30 sec holds, self sciatic nerve floss x15 total  Manual 3xc30 sec holds, s/l HF stretch 3x30 sec holds & adductor stretch 3 x 30 sec  3x30 sec holds HS, cross body, and HF stretching     glute stretching    HS stretch seated with SOS 10 x 10 sec  Self piri stretch w/ strap in supine and standing 10x5-6 sec holds    SLR D2 hip flexion PNF 3x10 on R        Add sq   Lateral step ups 6 inch 2 x 10  glute med taps 6" x20     bridging W/ GTB around knees 3x10   3x10  - not today  no    Heel raises    Hip clams w/ ER rotation x10-15    gastroc stretching   TrA single leg ext 2x10 B, 90/90 taps 2x10 total - not today      Sit<>stand    TRX squats 3x10     Mini squats TRX squats and lateral lunges min/modA 3x10 each  3x10 total     abd walking    GTB hip abd and monster walks 50'x6-8 each    Hip flexion   Hip flexor stretch on step 10 x 10 sec         ER stretch - placing right heel on left knee 5 secx 10       Manual HF and quad stretching 3x30 sec holds for each Piriformis stretch 3 x 30 sec       GTB single leg fall outs 3x10 B   SK to chest using SOS   3 x 30 sec         Bent knee fallout w 5 sec hold 2 x 10         Quad stretch w SOS 3 x 30 sec               Review of HEP and proper gait training x 3-4 mins   HEP progressions and review     Stationary bike   no         Modalities 17 18 19 20    Heat pre no no  no    Ice post  no no  no                Assessment: Tolerated treatment well  Patient demonstrated fatigue post treatment and would benefit from continued PT  Pt does well w/ training that involves hip abd/ER/flexion to simulate putting shoe on  Does well w/ stair training and lateral hip musculature training  Notes fatigue but no pain by end  Progress as able      Plan: Continue per plan of care   progress putting shoes on motion

## 2018-09-06 ENCOUNTER — OFFICE VISIT (OUTPATIENT)
Dept: PHYSICAL THERAPY | Facility: CLINIC | Age: 53
End: 2018-09-06
Payer: COMMERCIAL

## 2018-09-06 DIAGNOSIS — M25.551 PAIN IN RIGHT HIP: Primary | ICD-10-CM

## 2018-09-06 PROCEDURE — 97110 THERAPEUTIC EXERCISES: CPT

## 2018-09-06 PROCEDURE — 97112 NEUROMUSCULAR REEDUCATION: CPT

## 2018-09-06 NOTE — PROGRESS NOTES
Daily Note     Today's date: 2018  Patient name: Jim Diamond  : 1965  MRN: [id number]  Referring provider: Stephen Baron MD  Dx:   Encounter Diagnosis     ICD-10-CM    1   Pain in right hip M25 551        Start Time: 0850  Stop Time: 0940  Total time in clinic (min): 50 minutes    Subjective: Pt states her right hip remains tight - still has trouble trying to wash the bottom of her foot         Objective: Pt shows improved foot over knee motion (simulating putting shoe on) post supine and seated work      Precautions: posterior hip precautions     Specialty Daily Treatment Diary     Manual   18 94 9-6   9-6LAD 2x2 min holds   x2 min holds - not today  x2 min hold     DTM and TPR    no        R side lumbar UPAs grade II/IIIin prone x2-3 mins - not today         L S/l rotatioanl PPIVMs x 30 sec - not today  "the stick" to lateral hip x3 min        R piri release x3-4 mins        Exercise Diary  17 18 19 20 21   PROM        HS stretching Manual 3x30 sec holds, self sciatic nerve floss x15 total  Manual 3xc30 sec holds, s/l HF stretch 3x30 sec holds & adductor stretch 3 x 30 sec  3x30 sec holds HS, cross body, and HF stretching     glute stretching    HS stretch seated with SOS 10 x 10 sec  Self piri stretch w/ strap in supine and standing 10x5-6 sec holds Seated w SOS 5 x 10 sec ea    SLR D2 hip flexion PNF 3x10 on R        Add sq   Lateral step ups 6 inch 2 x 10  glute med taps 6" x20     bridging W/ GTB around knees 3x10   3x10  - not today  no    Heel raises    Hip clams w/ ER rotation x10-15 S/L CS 2 x 10    gastroc stretching   TrA single leg ext 2x10 B, 90/90 taps 2x10 total - not today      Sit<>stand    TRX squats 3x10  3 x 10   Mini squats TRX squats and lateral lunges min/modA 3x10 each  3x10 total  3 x 10    abd walking    GTB hip abd and monster walks 50'x6-8 each GTB 50' x 6 ea    Hip flexion   Hip flexor stretch on step 10 x 10 sec   10 x 10 sec       ER stretch - placing right heel on left knee 5 secx 10   10 x 5 sec     Manual HF and quad stretching 3x30 sec holds for each   Piriformis stretch 3 x 30 sec   HF stretch w SOS supine 3 x 30 sec     GTB single leg fall outs 3x10 B   SK to chest using SOS   3 x 30 sec   3 x 30 sec       Bent knee fallout w 5 sec hold 2 x 10         Quad stretch w SOS 3 x 30 sec   3 x 30 sec             Review of HEP and proper gait training x 3-4 mins   HEP progressions and review     Stationary bike   no         Modalities 17 18 19 20 21   Heat pre no no  no no   Ice post  no no  no No              Assessment; pt performs therex with very good effort willing to learn new stretches to improve functional mobility right hip for ADL's - donning sock/shoes, shaving, washing feet     P continue per POC

## 2018-09-19 ENCOUNTER — OFFICE VISIT (OUTPATIENT)
Dept: PHYSICAL THERAPY | Facility: CLINIC | Age: 53
End: 2018-09-19
Payer: COMMERCIAL

## 2018-09-19 DIAGNOSIS — M25.551 PAIN IN RIGHT HIP: Primary | ICD-10-CM

## 2018-09-19 PROCEDURE — G8978 MOBILITY CURRENT STATUS: HCPCS

## 2018-09-19 PROCEDURE — G8979 MOBILITY GOAL STATUS: HCPCS

## 2018-09-19 PROCEDURE — 97112 NEUROMUSCULAR REEDUCATION: CPT

## 2018-09-19 PROCEDURE — 97140 MANUAL THERAPY 1/> REGIONS: CPT

## 2018-09-19 NOTE — LETTER
2018    Ruben Fish MD  [address]  [address] [place] [address]    Patient: Brittany Salazar   YOB: 1965   Date of Visit: 2018     Encounter Diagnosis     ICD-10-CM    1  Pain in right hip M25 551        Dear Dr Terry Schwartz:    Please review the attached Plan of Care from Brittany Salazar recent visit  Update on progress! If you have any questions or concerns, please don't hesitate to call  Sincerely,    Marcin Damian, PT      Referring Provider:      I certify that I have read the below Plan of Care and certify the need for these services furnished under this plan of treatment while under my care  Ruben Fish MD  [address]  [address] [place] [address] [place] [address] Street: [phone]          Progress Note     Today's date: 2018  Patient name: Brittany Salazar  : 1965  MRN: [id number]  Referring provider: Prosper Leyva MD  Dx:   Encounter Diagnosis     ICD-10-CM    1  Pain in right hip M25 551                   Subjective: Pt feels that she is improving, notes no pain but significant soreness and tightness in R posterior lateral hip (over incisions)  Has discomfort deep into groin  She notes no pain on NPRS scale  Thinks that she is improving in being able to cross legs in order to put shoes and socks on  Feels that she is walking for longer durations w/ less overall discomfort  Pt would like to continue to improve her overall strength/stability and functional capacity  Pt updated functional status is as follows:          Objective: Hip flexion ROM to 110-115 deg actively w/o pain, abd to ~45 w/o pain, ext to ~10-15 w/o pain  Hip MMT 4-4+/5 for flexion, ext and abduction, 4/5 for ext, IR, ER, Core: 2+-3/5  Gait mechanics: pt shows very minimal Trendelenburg w/ improved hip equality of WB and not trunk lean    Pain in R hip 0-3/10    Short term 2 weeks  1) Pt will perform 10 functional squats w/o cueing or compensation, proper mechanics, and no pain - progressed  2) Pt will further improve B LE and core strength by 1/3 grade MMT   - progressed/achieved     Long Term 4 weeks  1) Pt will be functionally unlimited by hip w/ stairs, ambulation, transfers, dressing, and age appropriate household activities - progressed/acheived  2) Pt will improve FOTO by an additional 10 points - progressed  3) Pt will be (I) and complaint w/ final HEP  - progressed    **All goals extended two weeks**      Precautions: posterior hip precautions     Specialty Daily Treatment Diary     Manual  9/19 8/30 9/4 9-6   9-6LAD LAD on R 2x2 min holds  x2 min holds - not today  x2 min hold     DTM and TPR    no      R piri release x 5-6 mins   R side lumbar UPAs grade II/IIIin prone x2-3 mins - not today       MWM for R hip flex and IR  L S/l rotatioanl PPIVMs x 30 sec - not today  "the stick" to lateral hip x3 min        R piri release x3-4 mins        Exercise Diary  22  19 20 21   PROM        HS stretching 3x30 sec holds HS, cross body, and HF stretching   Manual 3xc30 sec holds, s/l HF stretch 3x30 sec holds & adductor stretch 3 x 30 sec  3x30 sec holds HS, cross body, and HF stretching     glute stretching  Self glute/piri and cross body stretch x3-4 mins total  HS stretch seated with SOS 10 x 10 sec  Self piri stretch w/ strap in supine and standing 10x5-6 sec holds Seated w SOS 5 x 10 sec ea    SLR        Add sq glute med taps 4-6" 2x10 B   Lateral step ups 6 inch 2 x 10  glute med taps 6" x20     bridging 2x10   3x10  - not today  no    Heel raises    Hip clams w/ ER rotation x10-15 S/L CS 2 x 10    gastroc stretching TrA 90/90 taps 2x10 B  TrA single leg ext 2x10 B, 90/90 taps 2x10 total - not today      Sit<>stand    TRX squats 3x10  3 x 10   Mini squats   3x10 total  3 x 10    abd walking Review of abd walking    GTB hip abd and monster walks 50'x6-8 each GTB 50' x 6 ea    Hip flexion   Hip flexor stretch on step 10 x 10 sec   10 x 10 sec     BOSU ball anterior and lateral lunges 2x10-15 B  ER stretch - placing right heel on left knee 5 secx 10   10 x 5 sec       Piriformis stretch 3 x 30 sec   HF stretch w SOS supine 3 x 30 sec       SK to chest using SOS   3 x 30 sec   3 x 30 sec       Bent knee fallout w 5 sec hold 2 x 10         Quad stretch w SOS 3 x 30 sec   3 x 30 sec                HEP progressions and review     Stationary bike   no         Modalities 22  19 20 21   Heat pre no   no no   Ice post  no   no No                Assessment: Tolerated treatment well  Patient demonstrated fatigue post treatment and would benefit from continued PT  Pt is making good progress towards all goals at this point  She has done well to improve strength  ROM improvements are minimal and are limited by tissue density restrictions through R posterior and lateral hip  Added gentle MWM for hip IR and flex today to help address ROM limitations into IR and flexion, these provide good benefit and will be progressed accordingly  Notes fatigue but no pain by end         Plan: Continue per plan of care  hip abd walking progressions, BOSU ball squats, anterior and lateral lunges

## 2018-09-19 NOTE — PROGRESS NOTES
Progress Note     Today's date: 2018  Patient name: Jose Luis Rodriguez  : 1965  MRN: [id number]  Referring provider: Sowmya Corbin MD  Dx:   Encounter Diagnosis     ICD-10-CM    1  Pain in right hip M25 551                   Subjective: Pt feels that she is improving, notes no pain but significant soreness and tightness in R posterior lateral hip (over incisions)  Has discomfort deep into groin  She notes no pain on NPRS scale  Thinks that she is improving in being able to cross legs in order to put shoes and socks on  Feels that she is walking for longer durations w/ less overall discomfort  Pt would like to continue to improve her overall strength/stability and functional capacity  Pt updated functional status is as follows:          Objective: Hip flexion ROM to 110-115 deg actively w/o pain, abd to ~45 w/o pain, ext to ~10-15 w/o pain  Hip MMT 4-4+/5 for flexion, ext and abduction, 4/5 for ext, IR, ER, Core: 2+-3/5  Gait mechanics: pt shows very minimal Trendelenburg w/ improved hip equality of WB and not trunk lean  Pain in R hip 0-3/10    Short term 2 weeks  1) Pt will perform 10 functional squats w/o cueing or compensation, proper mechanics, and no pain - progressed  2) Pt will further improve B LE and core strength by 1/3 grade MMT   - progressed/achieved     Long Term 4 weeks  1) Pt will be functionally unlimited by hip w/ stairs, ambulation, transfers, dressing, and age appropriate household activities - progressed/acheived  2) Pt will improve FOTO by an additional 10 points - progressed  3) Pt will be (I) and complaint w/ final HEP  - progressed    **All goals extended two weeks**      Precautions: posterior hip precautions     Specialty Daily Treatment Diary     Manual   9/4 9-6   9-6LAD LAD on R 2x2 min holds  x2 min holds - not today  x2 min hold     DTM and TPR    no      R piri release x 5-6 mins   R side lumbar UPAs grade II/IIIin prone x2-3 mins - not today       MWM for R hip flex and IR  L S/l rotatioanl PPIVMs x 30 sec - not today  "the stick" to lateral hip x3 min        R piri release x3-4 mins        Exercise Diary  22  19 20 21   PROM        HS stretching 3x30 sec holds HS, cross body, and HF stretching   Manual 3xc30 sec holds, s/l HF stretch 3x30 sec holds & adductor stretch 3 x 30 sec  3x30 sec holds HS, cross body, and HF stretching     glute stretching  Self glute/piri and cross body stretch x3-4 mins total  HS stretch seated with SOS 10 x 10 sec  Self piri stretch w/ strap in supine and standing 10x5-6 sec holds Seated w SOS 5 x 10 sec ea    SLR        Add sq glute med taps 4-6" 2x10 B   Lateral step ups 6 inch 2 x 10  glute med taps 6" x20     bridging 2x10   3x10  - not today  no    Heel raises    Hip clams w/ ER rotation x10-15 S/L CS 2 x 10    gastroc stretching TrA 90/90 taps 2x10 B  TrA single leg ext 2x10 B, 90/90 taps 2x10 total - not today      Sit<>stand    TRX squats 3x10  3 x 10   Mini squats   3x10 total  3 x 10    abd walking Review of abd walking    GTB hip abd and monster walks 50'x6-8 each GTB 50' x 6 ea    Hip flexion   Hip flexor stretch on step 10 x 10 sec   10 x 10 sec     BOSU ball anterior and lateral lunges 2x10-15 B  ER stretch - placing right heel on left knee 5 secx 10   10 x 5 sec       Piriformis stretch 3 x 30 sec   HF stretch w SOS supine 3 x 30 sec       SK to chest using SOS   3 x 30 sec   3 x 30 sec       Bent knee fallout w 5 sec hold 2 x 10         Quad stretch w SOS 3 x 30 sec   3 x 30 sec                HEP progressions and review     Stationary bike   no         Modalities 22  19 20 21   Heat pre no   no no   Ice post  no   no No                Assessment: Tolerated treatment well  Patient demonstrated fatigue post treatment and would benefit from continued PT  Pt is making good progress towards all goals at this point  She has done well to improve strength   ROM improvements are minimal and are limited by tissue density restrictions through R posterior and lateral hip  Added gentle MWM for hip IR and flex today to help address ROM limitations into IR and flexion, these provide good benefit and will be progressed accordingly  Notes fatigue but no pain by end         Plan: Continue per plan of care  hip abd walking progressions, BOSU ball squats, anterior and lateral lunges

## 2018-09-20 ENCOUNTER — TRANSCRIBE ORDERS (OUTPATIENT)
Dept: PHYSICAL THERAPY | Facility: CLINIC | Age: 53
End: 2018-09-20

## 2018-09-20 DIAGNOSIS — M25.551 PAIN IN RIGHT HIP: Primary | ICD-10-CM

## 2018-09-24 ENCOUNTER — OFFICE VISIT (OUTPATIENT)
Dept: PHYSICAL THERAPY | Facility: CLINIC | Age: 53
End: 2018-09-24
Payer: COMMERCIAL

## 2018-09-24 DIAGNOSIS — M25.551 PAIN IN RIGHT HIP: Primary | ICD-10-CM

## 2018-09-24 PROCEDURE — 97112 NEUROMUSCULAR REEDUCATION: CPT

## 2018-09-24 PROCEDURE — 97110 THERAPEUTIC EXERCISES: CPT

## 2018-09-24 NOTE — PROGRESS NOTES
Daily Note     Today's date: 2018  Patient name: Per Nieto  : 1965  MRN: [id number]  Referring provider: Preet Lechuga MD  Dx:   Encounter Diagnosis     ICD-10-CM    1  Pain in right hip M25 551                   Subjective: Pt reports continued soreness and discomfort through R quad along mid belly of RF  Was able to walk a mile over the weekend w/ friend but reports that this was difficult  Objective: Pt fatigues quickly w/ targeted quad strengthening on R but shows good form and pacing throughout        Precautions: posterior hip precautions     Specialty Daily Treatment Diary     Manual   9-6   9-6LAD LAD on R 2x2 min holds x2 min hold   x2 min hold     DTM and TPR          R piri release x 5-6 mins         MWM for R hip flex and IR   "the stick" to lateral hip x3 min      "the stick" to R anterior quad x 3 mins  R piri release x3-4 mins        Exercise Diary  22 23  20 21   PROM        HS stretching 3x30 sec holds HS, cross body, and HF stretching  3x30 sec holds HS, cross body, and HF stretching   3x30 sec holds HS, cross body, and HF stretching     glute stretching  Self glute/piri and cross body stretch x3-4 mins total x3-4 mins   Self piri stretch w/ strap in supine and standing 10x5-6 sec holds Seated w SOS 5 x 10 sec ea    SLR        Add sq glute med taps 4-6" 2x10 B  Reviewed   glute med taps 6" x20     bridging 2x10  Reviewed   no    Heel raises    Hip clams w/ ER rotation x10-15 S/L CS 2 x 10    gastroc stretching TrA 90/90 taps 2x10 B Reviewed       Sit<>stand    TRX squats 3x10  3 x 10   Mini squats     3 x 10    abd walking Review of abd walking  GTB abd walking and monster walks 40'x6-8   GTB hip abd and monster walks 50'x6-8 each GTB 50' x 6 ea    Hip flexion     10 x 10 sec     BOSU ball anterior and lateral lunges 2x10-15 B 2x10 each way   10 x 5 sec      Self HF and quad stretching in half kneel and standing x5 mins total   HF stretch w SOS supine 3 x 30 sec      Seated LAQ 4# 3x10 on R, SAQ w/ emphasis on QS x20 total    3 x 30 sec                 3 x 30 sec                HEP progressions and review     Stationary bike            Modalities 22 23  20 21   Heat pre no no  no no   Ice post  no no  no No                  Assessment: Tolerated treatment well  Patient demonstrated fatigue post treatment and would benefit from continued PT  Pt reports greater diffculty w/ targeted R quad strengthening  Verbalizes understanding of benefit of stretching and strengthening this area to improved overall hip mechanics w/ functional activities  Appropriate for continued progressions  Plan: Continue per plan of care   seated knee ext, HF and DF w/ tband

## 2018-09-26 ENCOUNTER — OFFICE VISIT (OUTPATIENT)
Dept: PHYSICAL THERAPY | Facility: CLINIC | Age: 53
End: 2018-09-26
Payer: COMMERCIAL

## 2018-09-26 DIAGNOSIS — M25.551 PAIN IN RIGHT HIP: Primary | ICD-10-CM

## 2018-09-26 PROCEDURE — 97112 NEUROMUSCULAR REEDUCATION: CPT

## 2018-09-26 PROCEDURE — 97140 MANUAL THERAPY 1/> REGIONS: CPT

## 2018-09-26 NOTE — PROGRESS NOTES
Daily Note     Today's date: 2018  Patient name: Jim Diamond  : 1965  MRN: [id number]  Referring provider: Neymar Vasquez MD  Dx:   Encounter Diagnosis     ICD-10-CM    1  Pain in right hip M25 551                   Subjective: Pt reports that low back is very sore and painful today, points to R side low back  Reports that this happens every now and then, but now it impacts hip mobility  Felt good after last session, back pain started yesterday  Objective: Mod hypomobility through R side lumbar spine w/ UPAs, good reduction w/ mobs combined w/ LIZY and press ups        Precautions: posterior hip precautions     Specialty Daily Treatment Diary     Manual    9-6   9-6LAD LAD on R 2x2 min holds x2 min hold  LAD on R x 2 min     DTM and TPR          R piri release x 5-6 mins   R side lumbar UPAs grade II/III x3-4 mins      MWM for R hip flex and IR  S/l rotational PPIVMs x30 sec       "the stick" to R anterior quad x 3 mins          Exercise Diary  22 23 24  21   PROM   S/l open books x 15-20 B     HS stretching 3x30 sec holds HS, cross body, and HF stretching  3x30 sec holds HS, cross body, and HF stretching  HS and glute stretching w/ strap x 3-4 mins self     glute stretching  Self glute/piri and cross body stretch x3-4 mins total x3-4 mins  LIZY x 2-3 mins, press ups 2x10  Seated w SOS 5 x 10 sec ea    SLR   TrA isos and marching x 3-4 mins     Add sq glute med taps 4-6" 2x10 B  Reviewed       bridging 2x10  Reviewed       Heel raises     S/L CS 2 x 10    gastroc stretching TrA 90/90 taps 2x10 B Reviewed       Sit<>stand     3 x 10   Mini squats     3 x 10    abd walking Review of abd walking  GTB abd walking and monster walks 40'x6-8    GTB 50' x 6 ea    Hip flexion     10 x 10 sec     BOSU ball anterior and lateral lunges 2x10-15 B 2x10 each way 3x10 each way  10 x 5 sec      Self HF and quad stretching in half kneel and standing x5 mins total   HF stretch w SOS supine 3 x 30 sec      Seated LAQ 4# 3x10 on R, SAQ w/ emphasis on QS x20 total    3 x 30 sec       Rocker stretch for gastroc 3x10 sec holds        Self HF stretch on step x 3-4 mins  3 x 30 sec       glute med taps 4" 2x10             Stationary bike            Modalities 22 23 24  21   Heat pre no no no  no   Ice post  no no no  No                  Assessment: Tolerated treatment well  Patient demonstrated fatigue post treatment and would benefit from continued PT  Pt notes significant reduction in low back sx post manual therapy and stretching today  Further progressions held so as not to exacerbate low back sx  She will continue in a week and half, will be away next week  Progress towards higher level functional ROM and strength at that time  Plan: Continue per plan of care   BOSU step ups, squats, taps and HF

## 2018-10-08 ENCOUNTER — OFFICE VISIT (OUTPATIENT)
Dept: PHYSICAL THERAPY | Facility: CLINIC | Age: 53
End: 2018-10-08
Payer: COMMERCIAL

## 2018-10-08 DIAGNOSIS — M25.551 PAIN IN RIGHT HIP: Primary | ICD-10-CM

## 2018-10-08 PROCEDURE — 97112 NEUROMUSCULAR REEDUCATION: CPT

## 2018-10-08 PROCEDURE — 97140 MANUAL THERAPY 1/> REGIONS: CPT

## 2018-10-08 NOTE — PROGRESS NOTES
Daily Note     Today's date: 10/8/2018  Patient name: Pepe Castro  : 1965  MRN: [id number]  Referring provider: Alec Gyale MD  Dx:   Encounter Diagnosis     ICD-10-CM    1  Pain in right hip M25 551                   Subjective: Pt reports tightness through quad and hips  Reports that she is getting better but progress is slower than expected  Objective: Pt fatigues quickly but shows good control during slant board single leg squats, corrects well for min form deficits w/ cueing         Precautions: posterior hip precautions     Specialty Daily Treatment Diary     Manual  9/19 9/24 9/26 10/8    9-6LAD LAD on R 2x2 min holds x2 min hold  LAD on R x 2 min x2 min hold     DTM and TPR          R piri release x 5-6 mins   R side lumbar UPAs grade II/III x3-4 mins      MWM for R hip flex and IR  S/l rotational PPIVMs x30 sec       "the stick" to R anterior quad x 3 mins  "the stick" to R medial quad x8 mins total        Exercise Diary   23 24 25    PROM   S/l open books x 15-20 B     HS stretching 3x30 sec holds HS, cross body, and HF stretching  3x30 sec holds HS, cross body, and HF stretching  HS and glute stretching w/ strap x 3-4 mins self 3-4 mins total     glute stretching  Self glute/piri and cross body stretch x3-4 mins total x3-4 mins  LIZY x 2-3 mins, press ups 2x10 LIZY x 2-3 mins    SLR   TrA isos and marching x 3-4 mins Cross body stretch 3x30 sec holds     Add sq glute med taps 4-6" 2x10 B  Reviewed       bridging 2x10  Reviewed       Heel raises        gastroc stretching TrA 90/90 taps 2x10 B Reviewed       Sit<>stand        Mini squats        abd walking Review of abd walking  GTB abd walking and monster walks 40'x6-8       Hip flexion         BOSU ball anterior and lateral lunges 2x10-15 B 2x10 each way 3x10 each way 2x10 each way      Self HF and quad stretching in half kneel and standing x5 mins total  Slant board ecc single leg squats 3x10      Seated LAQ 4# 3x10 on R, SAQ w/ emphasis on QS x20 total          Rocker stretch for gastroc 3x10 sec holds Rocker stretch for gastroc 3x30 sec holds       Self HF stretch on step x 3-4 mins Manual 3x30 sec holds        glute med taps 4" 2x10 2x10            Stationary bike            Modalities 22 23 24 25    Heat pre no no no no    Ice post  no no no no                  Assessment: Tolerated treatment well  Patient demonstrated fatigue post treatment and would benefit from continued PT  Does well w/ targeted quad stretching and strengthening  Discussed possible plan to end formal visits  She will continue for the next week or two w/ focus on hip and quad strengthening  Plan is to taper off and for pt to increase home program in next week or two  Plan: Continue per plan of care   quad strengthening

## 2018-10-11 ENCOUNTER — OFFICE VISIT (OUTPATIENT)
Dept: PHYSICAL THERAPY | Facility: CLINIC | Age: 53
End: 2018-10-11
Payer: COMMERCIAL

## 2018-10-11 DIAGNOSIS — M25.551 PAIN IN RIGHT HIP: Primary | ICD-10-CM

## 2018-10-11 PROCEDURE — 97112 NEUROMUSCULAR REEDUCATION: CPT

## 2018-10-11 PROCEDURE — 97140 MANUAL THERAPY 1/> REGIONS: CPT

## 2018-10-11 PROCEDURE — 97110 THERAPEUTIC EXERCISES: CPT

## 2018-10-11 NOTE — PROGRESS NOTES
Daily Note     Today's date: 10/11/2018  Patient name: Pepe Castro  : 1965  MRN: [id number]  Referring provider: Alec Gayle MD  Dx:   Encounter Diagnosis     ICD-10-CM    1  Pain in right hip M25 551                   Subjective: Miles Face reports that her limitations are are inability to wash right foot, difficulty shaving and putting shoes and socks on  Describes tightness in right thigh which increases with walking long distances         Objective: See treatment diary below  Precautions: posterior hip precautions     Specialty Daily Treatment Diary     Manual  9/19 9/24 9/26 10/8 10/11/18   9-6LAD LAD on R 2x2 min holds x2 min hold  LAD on R x 2 min x2 min hold     DTM and TPR          R piri release x 5-6 mins   R side lumbar UPAs grade II/III x3-4 mins      MWM for R hip flex and IR  S/l rotational PPIVMs x30 sec  IASTM right rectus/quad performed          Manual posterior right ilium mobilization in prone       "the stick" to R anterior quad x 3 mins  "the stick" to R medial quad x8 mins total Manual maximiliano test stretch [id number]       15 min total        Exercise Diary   23 24 25 36: 10//11/18   PROM   S/l open books x 15-20 B  Ktape paper off tension for scar mobility over scar   HS stretching 3x30 sec holds HS, cross body, and HF stretching  3x30 sec holds HS, cross body, and HF stretching  HS and glute stretching w/ strap x 3-4 mins self 3-4 mins total  Hi flexor stretching w/ SOS    glute stretching  Self glute/piri and cross body stretch x3-4 mins total x3-4 mins  LIZY x 2-3 mins, press ups 2x10 LIZY x 2-3 mins    SLR   TrA isos and marching x 3-4 mins Cross body stretch 3x30 sec holds  SLR [id number]    Add sq glute med taps 4-6" 2x10 B  Reviewed       bridging 2x10  Reviewed       Heel raises        gastroc stretching TrA 90/90 taps 2x10 B Reviewed       Sit<>stand        Mini squats        abd walking Review of abd walking  GTB abd walking and monster walks 40'x6-8       Hip flexion BOSU ball anterior and lateral lunges 2x10-15 B 2x10 each way 3x10 each way 2x10 each way      Self HF and quad stretching in half kneel and standing x5 mins total  Slant board ecc single leg squats 3x10      Seated LAQ 4# 3x10 on R, SAQ w/ emphasis on QS x20 total          Rocker stretch for gastroc 3x10 sec holds Rocker stretch for gastroc 3x30 sec holds       Self HF stretch on step x 3-4 mins Manual 3x30 sec holds        glute med taps 4" 2x10 2x10         Lumbar flexion in sitting 5 sec x 5        Exhale for TA faciliation 10x         MET for LL discrepancy 10 sec x 7           Stationary bike     30 min        Modalities 22 23 24 25    Heat pre no no no no    Ice post  no no no no                  Assessment:   Discussed rectus femoris tightness, patient was  positive for leg length discrepancy with right leg considerably long then left in supine after bridge, upon long sit, difference has been signficantly decreased although still mildly present  Noted in standing right iliac crest was higher, and patient postures in right knee flexion  + for PSIS tenderness on right  negatvie march test  Therfore, introduced MET to equalize leg length specifically for anteriorly rotated right and posteriorly rotated left  Patient reports relieve in low back symptoms with exhale and inherent pelvic tilt  Describes a release in her muscle tension  Added Ktape for scar release due to patient reported burning in scar area and pain: skin inspection: no redness or irritation noted along scar  Instructed paitent to increase her hip flexor stretch (maximiliano test position) at home and to precede it with rolling out rectus muscle  Plan: above treatment was discussed with primary PT , progress as indicated

## 2018-10-17 ENCOUNTER — OFFICE VISIT (OUTPATIENT)
Dept: PHYSICAL THERAPY | Facility: CLINIC | Age: 53
End: 2018-10-17
Payer: COMMERCIAL

## 2018-10-17 DIAGNOSIS — M25.551 PAIN IN RIGHT HIP: Primary | ICD-10-CM

## 2018-10-17 PROCEDURE — 97140 MANUAL THERAPY 1/> REGIONS: CPT

## 2018-10-17 PROCEDURE — 97112 NEUROMUSCULAR REEDUCATION: CPT

## 2018-10-17 PROCEDURE — G8979 MOBILITY GOAL STATUS: HCPCS

## 2018-10-17 PROCEDURE — G8978 MOBILITY CURRENT STATUS: HCPCS

## 2018-10-17 PROCEDURE — 97110 THERAPEUTIC EXERCISES: CPT

## 2018-10-17 NOTE — PROGRESS NOTES
Progress Note     Today's date: 10/17/2018  Patient name: Helga Blackwood  : 1965  MRN: [id number]  Referring provider: Sourav Maya MD  Dx:   Encounter Diagnosis     ICD-10-CM    1  Pain in right hip M25 551                   Subjective: Pt thinks that ktape over scar was very helpful after last session  Notes that she had a few days of no pain over scar for the first time in months  Very excited about this  Notes that quad continues to tighten but maybe to a slightly lesser extent than in the past  Pain is minimal at the moment, only up to a 3/10 at worst  Feels that she will be ready for d/c at end of October  Feels that PT has been very beneficial and that she is approaching time when she can manage remaining sx on own  Pt updated functional status is as follows:        Objective: Hip flexion ROM to 112-115 deg actively w/o pain, abd to 45 w/o pain, ext to 15 w/o pain  Hip MMT 4+/5 for flexion, ext and abduction, 4+/5 for ext, IR, ER, Core: 3/5  Gait mechanics: L side trunk lean w/ R side Trendelenburg, good hip flexion and heel strike, noted to have improved stride length over short distances  Pt able to combine R hip flexion and ER to foot over knee in order to reach foot, unable to put shoes/socks on actively at this time  Pain in R hip 0-3/10 (continued)    Short term 2 weeks  1) Pt will perform 10 functional squats w/o cueing or compensation, proper mechanics, and no pain - progressed  2) Pt will further improve B LE and core strength by 1/3 grade MMT   - achieved     Long Term 4 weeks  1) Pt will be functionally unlimited by hip w/ stairs, ambulation, transfers, dressing, and age appropriate household activities - progressed/achieved  2) Pt will improve FOTO by an additional 10 points - progressed  3) Pt will be (I) and complaint w/ final HEP  - progressed    New goals 10/17/18:  STG and LTG goals set at 2 weeks for d/c:  1) Pt will improve FOTO by additional 10 points  2) Pt will actively put on and remove shoes/socks w/o deficit  3) Pt will report pain at worse 2/10  4) Pt will be (I) and complaint w/ final HEP  5) Pt will be functionally unlimited w/ stairs        Precautions: posterior hip precautions     Specialty Daily Treatment Diary     Manual  10/17  9/26 10/8 10/11/18   9-6LAD LAD x 2 min hold  LAD on R x 2 min x2 min hold     DTM and TPR            R side lumbar UPAs grade II/III x3-4 mins      ktape over incision w/ spacer strips x5 mins  S/l rotational PPIVMs x30 sec  IASTM right rectus/quad performed      Manual assisted MET R post rotation 2x2-3 mins     Manual posterior right ilium mobilization in prone         "the stick" to R medial quad x8 mins total Manual maximiliano test stretch [id number]       15 min total        Exercise Diary  27  24 25 26: 10//11/18   PROM x2-3 mins   S/l open books x 15-20 B  Ktape paper off tension for scar mobility over scar   HS stretching HF in s/l 3x30 sec holds   HS and glute stretching w/ strap x 3-4 mins self 3-4 mins total  Hi flexor stretching w/ SOS    glute stretching  Self w/ strap and piri 3x30 sec holds each   LIZY x 2-3 mins, press ups 2x10 LIZY x 2-3 mins    SLR Reviewed   TrA isos and marching x 3-4 mins Cross body stretch 3x30 sec holds  SLR [id number]    Add sq        bridging Seated knee flex and ext machine 20-30# 3x10 for each       Heel raises        gastroc stretching        Sit<>stand        Mini squats        abd walking        Hip flexion           3x10 each way 2x10 each way        Slant board ecc single leg squats 3x10             3x30 sec holds   Rocker stretch for gastroc 3x10 sec holds Rocker stretch for gastroc 3x30 sec holds       Self HF stretch on step x 3-4 mins Manual 3x30 sec holds      4" 2x10 total   glute med taps 4" 2x10 2x10     TrA GTB pallof press and core rotations 2x10-15 each    Lumbar flexion in sitting 5 sec x 5        Exhale for TA faciliation 10x     HEP education and review    MET for LL discrepancy 10 sec x 7           Stationary bike     30 min        Modalities 27  24 25 26   Heat pre no  no no    Ice post  no  no no                  Assessment: Tolerated treatment well  Patient demonstrated fatigue post treatment and would benefit from continued PT  Pt has made good progress towards all goals  ROM and strength are approaching acceptable levels for d/c, as is functional mobility  She is nearing goal of being able to independently put on shoes and socks in sitting  She is noted to have slight leg length discrepancy R LE>L LE  Good correction w/ MET, we will assess to see how well this is maintained between sessions  She was educated on fact taht this discrepancy likely has been present for many years and contributes to, but is not the cause, of sx  We discussed d/c at the end of this month barring setback, pt is agreeable  She is interested in joining [facility]  [employer] after time in PT has ended  Plan: Continue per plan of care  gym program progressions

## 2018-10-22 ENCOUNTER — OFFICE VISIT (OUTPATIENT)
Dept: PHYSICAL THERAPY | Facility: CLINIC | Age: 53
End: 2018-10-22
Payer: COMMERCIAL

## 2018-10-22 DIAGNOSIS — M25.551 PAIN IN RIGHT HIP: Primary | ICD-10-CM

## 2018-10-22 PROCEDURE — 97112 NEUROMUSCULAR REEDUCATION: CPT

## 2018-10-22 PROCEDURE — 97110 THERAPEUTIC EXERCISES: CPT

## 2018-10-22 NOTE — PROGRESS NOTES
Daily Note     Today's date: 10/22/2018  Patient name: Eboni Huber  : 1965  MRN: [id number]  Referring provider: Clayton Edwards MD  Dx:   Encounter Diagnosis     ICD-10-CM    1  Pain in right hip M25 551                   Subjective: Pt notes that ktape continues to be very beneficial  Scar is bothering her much less  Thinks that getting shoes and socks on is improving  Feels that she is on track for d/c at the end of the month  Objective: Pt noted to have improved hip ER in supine post MWM w/ belt         Precautions: posterior hip precautions     Specialty Daily Treatment Diary     Manual  10/17 10/22  10/8 10/11/18   9-6LAD LAD x 2 min hold x2 min   x2 min hold     DTM and TPR                  ktape over incision w/ spacer strips x5 mins ktape w/o spacer strips to same area x 2-3 mins   IASTM right rectus/quad performed      Manual assisted MET R post rotation 2x2-3 mins     Manual posterior right ilium mobilization in prone         "the stick" to R medial quad x8 mins total Manual maximiliano test stretch [id number]       15 min total        Exercise Diary  27 28  25 26: 10//11/18   PROM x2-3 mins  x2-3 mins w/ manual piri stretching 3x30 sec holds    Ktape paper off tension for scar mobility over scar   HS stretching HF in s/l 3x30 sec holds  Self 3x30 sec holds   3-4 mins total  Hi flexor stretching w/ SOS    glute stretching  Self w/ strap and piri 3x30 sec holds each  Self w/ strap 3x30 sec holds   LIZY x 2-3 mins    SLR Reviewed    Cross body stretch 3x30 sec holds  SLR [id number]    Add sq        bridging Seated knee flex and ext machine 20-30# 3x10 for each 40# w/ 3 sec ecc 3x10 each       Heel raises        gastroc stretching        Sit<>stand        Mini squats        abd walking        Hip flexion            2x10 each way        Slant board ecc single leg squats 3x10             3x30 sec holds    Rocker stretch for gastroc 3x30 sec holds        Manual 3x30 sec holds      4" 2x10 total glute med taps 4" 3x10 B  2x10     TrA GTB pallof press and core rotations 2x10-15 each    Lumbar flexion in sitting 5 sec x 5     Black tband hip abd and monster walking 50'x8-10    Exhale for TA faciliation 10x     HEP education and review Reviewed    MET for LL discrepancy 10 sec x 7           Stationary bike     30 min        Modalities 27 28  25 26   Heat pre no no  no    Ice post  no no  no                  Assessment: Tolerated treatment well  Patient demonstrated fatigue post treatment and would benefit from continued PT  Continues to show positive gains towards all goals  Strength is much improved and she shows excellent control over leg ext and hamstring curl machines  Ella Sharma again applied as it has been a benefit thus far  Progress as sx allow, heavy emphasis on HEP  Plan: Continue per plan of care   stair training HEP

## 2018-10-24 ENCOUNTER — OFFICE VISIT (OUTPATIENT)
Dept: PHYSICAL THERAPY | Facility: CLINIC | Age: 53
End: 2018-10-24
Payer: COMMERCIAL

## 2018-10-24 DIAGNOSIS — M25.551 PAIN IN RIGHT HIP: Primary | ICD-10-CM

## 2018-10-24 PROCEDURE — 97110 THERAPEUTIC EXERCISES: CPT

## 2018-10-24 PROCEDURE — 97140 MANUAL THERAPY 1/> REGIONS: CPT

## 2018-10-24 NOTE — PROGRESS NOTES
Daily Note     Today's date: 10/24/2018  Patient name: Monserrat Chavis  : 1965  MRN: [id number]  Referring provider: Carter Dixon MD  Dx:   Encounter Diagnosis     ICD-10-CM    1  Pain in right hip M25 551                   Subjective: Pt reports no new complaints  Felt good after last session  Thinks she will be ready for d/c at next session  Objective: Pt IR/ER ROM improves post MWM w/ belt for this motion, requires min cueing for glute drive during exercise program but corrects quickly and maintains  Precautions: posterior hip precautions     Specialty Daily Treatment Diary     Manual  10/17 10/22 10/24  10/11/18   9-6LAD LAD x 2 min hold x2 min  LAD x 2 min hold      DTM and TPR                  ktape over incision w/ spacer strips x5 mins ktape w/o spacer strips to same area x 2-3 mins Not today   IASTM right rectus/quad performed      Manual assisted MET R post rotation 2x2-3 mins     Manual posterior right ilium mobilization in prone         MWM for R hip IR/ER and flex x15-20 B, lateral belt distraction x 2-3 mins   Manual maximiliano test stretch [id number]       15 min total        Exercise Diary  27 28 29  26: 10//11/18   PROM x2-3 mins  x2-3 mins w/ manual piri stretching 3x30 sec holds  x2-3 mins w/ end range stretching   Ktape paper off tension for scar mobility over scar   HS stretching HF in s/l 3x30 sec holds  Self 3x30 sec holds  Self and manual 3x30 sec holds   Hi flexor stretching w/ SOS    glute stretching  Self w/ strap and piri 3x30 sec holds each  Self w/ strap 3x30 sec holds  Self w/ strap 3x30 sec holds      SLR Reviewed     SLR [id number]    Add sq        bridging Seated knee flex and ext machine 20-30# 3x10 for each 40# w/ 3 sec ecc 3x10 each  40# 3x10-15 each      Heel raises        gastroc stretching        Sit<>stand        Mini squats        abd walking        Hip flexion                   Standing hip/piri stretch w/ self mini squat x 2-3 mins              3x30 sec holds                 4" 2x10 total  glute med taps 4" 3x10 B 4" 3x10-15       TrA GTB pallof press and core rotations 2x10-15 each  BOSU anterior and lateral step ups 3x10-15 each   Lumbar flexion in sitting 5 sec x 5     Black tband hip abd and monster walking 50'x8-10  Black tband hip abd and monster walking 50'x8-10   Exhale for TA faciliation 10x     HEP education and review Reviewed  x3-4 mins   MET for LL discrepancy 10 sec x 7      Slant board ecc squats w/ double leg lift 3x10      Stationary bike     30 min        Modalities 27 28 29  26   Heat pre no no no     Ice post  no no no                   Assessment: Tolerated treatment well  Patient demonstrated fatigue post treatment and would benefit from continued PT  Pt is on track for d/c to HEP at next session  She has made excellent progress and will be provided w/ updated HEP at next session  Plan: Continue per plan of care   HEP reinforcement

## 2018-10-30 ENCOUNTER — APPOINTMENT (OUTPATIENT)
Dept: PHYSICAL THERAPY | Facility: CLINIC | Age: 53
End: 2018-10-30
Payer: COMMERCIAL

## 2018-10-31 ENCOUNTER — OFFICE VISIT (OUTPATIENT)
Dept: PHYSICAL THERAPY | Facility: CLINIC | Age: 53
End: 2018-10-31
Payer: COMMERCIAL

## 2018-10-31 DIAGNOSIS — M25.551 PAIN IN RIGHT HIP: Primary | ICD-10-CM

## 2018-10-31 PROCEDURE — G8980 MOBILITY D/C STATUS: HCPCS

## 2018-10-31 PROCEDURE — 97140 MANUAL THERAPY 1/> REGIONS: CPT

## 2018-10-31 PROCEDURE — 97110 THERAPEUTIC EXERCISES: CPT

## 2018-10-31 PROCEDURE — 97112 NEUROMUSCULAR REEDUCATION: CPT

## 2018-10-31 PROCEDURE — G8979 MOBILITY GOAL STATUS: HCPCS

## 2018-10-31 NOTE — PROGRESS NOTES
Physical Therapy Discharge Report    Today's date: 10/31/2018  Patient name: Martinez Ramos  : 1965  MRN: [id number]  Referring provider: Sejal Brennan MD  Dx:   Encounter Diagnosis     ICD-10-CM    1  Pain in right hip M25 551                    There is no problem list on file for this patient  No past medical history on file  No past surgical history on file  Assessment:  Most recent visit: see below  Exercises performed:  HEP reviewed, see below     Goals and Progress:  STG and LTG goals set at 2 weeks for d/c:  1) Pt will improve FOTO by additional 10 points - no progress   2) Pt will actively put on and remove shoes/socks w/o deficit - progressed   3) Pt will report pain at worse /10  - progressed/achieved   4) Pt will be (I) and complaint w/ final HEP - achieved   5) Pt will be functionally unlimited w/ stairs  - achieved     Plan:  Plan: d/c to HEP     Additional Discharge Considerations:  New HEP handout issued  Asked to call or email or stop in w/ any questions  Subjective:  Patient's response to therapy: Pt reports that her hip has improved significantly throughout her time in skilled PT  Reports that her final jf is being able to don/doff sneakers and socks independently  This has gotten better but she is still not yet able to do this on her own  She reports pain is minimal now  Thinks she can manage remaining sx on own  Will be joining [employer] in near future to continue independent work  Updated functional status to follow:     Objective: Hip flexion ROM to 112-117 deg actively w/o pain, abd to 45 w/o pain, ext to 15-18 w/o pain  Hip MMT 4+/5 for flexion, ext and abduction, 4+-5/5 for ext, IR, ER, Core: 3+/5  Gait mechanics: min L side trunk lean due to R Trendelenburg, not antalgic, improved stride length and speed  Pt able to combine R hip flexion and ER to foot over knee in order to reach foot, unable to put shoes/socks on actively at this time   Able to hook sock over R big toe  Pain in R hip 0-2/10     Sit<>stand: from chair height w/ min UE support and no pain, stairs are performed w/o difficulty w/ min UE support  Pt (I) w/ all gym machine exercises         Precautions: posterior hip precautions     Specialty Daily Treatment Diary     Manual  10/17 10/22 10/24 10/31    9-6LAD LAD x 2 min hold x2 min  LAD x 2 min hold  2x2 min holds    DTM and TPR                  ktape over incision w/ spacer strips x5 mins ktape w/o spacer strips to same area x 2-3 mins Not today  x2-3 mins w/o spacer strips      Manual assisted MET R post rotation 2x2-3 mins           MWM for R hip IR/ER and flex x15-20 B, lateral belt distraction x 2-3 mins  x15-20 for each, lateral belt distraction  x2-3 mins         Exercise Diary  27 28 29 30    PROM x2-3 mins  x2-3 mins w/ manual piri stretching 3x30 sec holds  x2-3 mins w/ end range stretching  x2-3 mins     HS stretching HF in s/l 3x30 sec holds  Self 3x30 sec holds  Self and manual 3x30 sec holds  Self and manual 3x30 sec holds     glute stretching  Self w/ strap and piri 3x30 sec holds each  Self w/ strap 3x30 sec holds  Self w/ strap 3x30 sec holds  3x30 sec holds    SLR Reviewed        Add sq        bridging Seated knee flex and ext machine 20-30# 3x10 for each 40# w/ 3 sec ecc 3x10 each  40# 3x10-15 each  40-50# 3x10 for each    Heel raises    Leg press 30# 3x10     gastroc stretching        Sit<>stand        Mini squats        abd walking        Hip flexion                   Standing hip/piri stretch w/ self mini squat x 2-3 mins Review              3x30 sec holds                 4" 2x10 total  glute med taps 4" 3x10 B 4" 3x10-15  Stair review x 2-3 mins      TrA GTB pallof press and core rotations 2x10-15 each  BOSU anterior and lateral step ups 3x10-15 each  Reviewed       Black tband hip abd and monster walking 50'x8-10  Black tband hip abd and monster walking 50'x8-10  reveiwed      HEP education and review Reviewed  x3-4 mins  Final HEP review w/ updated handout x 5-6 mins        Slant board ecc squats w/ double leg lift 3x10      Stationary bike            Modalities 27 28 29 30    Heat pre no no no no    Ice post  no no no no                  Assessment: Tolerated treatment well  Patient has done well in her time in skilled PT  Gait mechanics have largely normalized, pain has decreased significantly, and her ability to negotiate stairs is much improved  She was issued an updated HEP and will follow up w/ any questions  Pt plans on joining [facility] [address]  [employer] to continue strengthening on own  She has been a pleasure to work with        Plan: D/C to HEP

## 2021-01-22 NOTE — LETTER
May 1, 2018    Bird Yu MD  [address]  [address]  [address] [place]    Patient: Marshall English   YOB: 1965   Date of Visit: 2/15/2018     Encounter Diagnosis     ICD-10-CM    1  Right hip pain M25 551        Dear Dr Joann Reno:    Please review the attached Plan of Care from Marshall English recent visit  Please verify that you agree therapy should continue by signing the attached document and sending it back to our office  If you have any questions or concerns, please don't hesitate to call  Sincerely,    Khushbu Gardner, PT      Referring Provider:      I certify that I have read the below Plan of Care and certify the need for these services furnished under this plan of treatment while under my care  Bird Yu MD  [address]  [place] [address]: [phone]          PT Re-Evaluation     Today's date: 2/15/2018  Patient name: Marshall English  : 1965  MRN: [id number]  Referring provider: Liban Loco MD  Dx:   Encounter Diagnosis   Name Primary?  Right hip pain Yes                  Assessment    Assessment details: Pt is a pleasant 50 y o  female who presents to [facility] PT s/p R hip arthroscopic repair for meniscus on 2018  Today, she presents with decreased and painful R hip ROM, R hip and core strength/stability, high self reports of pain w/ activity, and decreased tolerance to all activities  She is not yet driving on own  Functionally, she is limited in her ability to stand and ambulate, perform functional transfers, negotiate stairs, sleep on R side, and perform normal recreational activities  She is limited by low back pain at this time as well  Pt will benefit from skilled PT to address the aforementioned deficits and limitations in an effort to maximize pain free functional mobility and overall quality of life  Progress as able with these goals in mind  Understanding of Dx/Px/POC: good   Prognosis: good    Goals  Short term goals (3 weeks):  1) Pt will improve R hip ROM deficits by at least 10 degrees each pain free  2) Pt will improve R hip and core strength deficits by 1/3 grade MMT  3)  Pt will minimize soft tissue density restrictions through R anterior/lateral/posterior hip  4) Pt will initiate and progress HEP w/ special emphasis on     Long term goals (6 weeks)  1) Pt will improve FOTO to at least 70  2) Pt will stand and ambulate for durations greater than 30 min w/ normalized gait mechanics and pain <3/10 throughout  3) Pt will be functionally unlimited w/ stair climbing  4) Pt will be independent and compliant w/ HEP in order to maximize functional benefit of skilled PT following d/c        Plan  Patient would benefit from: skilled PT  Planned modality interventions: cryotherapy and thermotherapy: hydrocollator packs  Planned therapy interventions: activity modification, abdominal trunk stabilization, manual therapy, massage, transfer training, therapeutic training, therapeutic exercise, therapeutic activities, stretching, strengthening, patient education, neuromuscular re-education, home exercise program, graded exercise, graded activity, gait training, functional ROM exercises, flexibility, body mechanics training and balance  Frequency: 2x week  Duration in visits: 12  Duration in weeks: 6  Treatment plan discussed with: patient  Plan details: POC to include: heat or active w/u, B LE stretching, supine core stab and hip stab, standing gait and balance training, stair training     HEP to start: add squeezes, bridging, SLR, sit<>stands          Subjective Evaluation    History of Present Illness  Date of surgery: 1/30/2018  Mechanism of injury: Pt had arthroscopic surgery on 1/30/2018 to repair torn labrum  Was seen previously for crutch training session prior to surgery   Born with congenital hip dysplasia and notes that this caused pain for many years  Pt initially felt really good following surgery, has been more sore over the last week  Notes weakness in R hip that causes difficulty w/ movement, especially walking  Pain only comes when she tires and her mechanics change  Excited to get into formal therapy  Functional status is as follows:   Quality of life: good    Pain  Current pain ratin  At best pain ratin  At worst pain ratin  Location: R lateral hip  Quality: throbbing, sharp and burning  Relieving factors: medications, rest and change in position  Aggravating factors: standing, walking and stair climbing  Progression: improved    Social Support  Steps to enter house: yes  Stairs in house: yes   Lives in: multiple-level home  Lives with: spouse    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, works in school system )    Diagnostic Tests  No diagnostic tests performed  Patient Goals  Patient goals for therapy: increased strength, decreased pain, increased motion, return to sport/leisure activities and return to work  Patient goal: I would like to walk without being afraid of falling, I want to get my socks on without help  Objective     Palpation     Additional Palpation Details  Increased tissue density through R HF and glutes, min/mod TTP through R anterior and lateral hip      Neurological Testing     Sensation     Hip   Left Hip   Intact: light touch    Right Hip   Intact: light touch    Active Range of Motion   Left Hip   Normal active range of motion    Right Hip   Flexion: 95 degrees   Abduction: 35 degrees   External rotation (90/90): 45 degrees   Internal rotation (90/90): 18 degrees     Additional Active Range of Motion Details  L hip ROM limited by low back pain     Passive Range of Motion   Left Hip   Normal passive range of motion    Right Hip   Flexion: 105 degrees   Abduction: 40 degrees     Strength/Myotome Testing     Left Hip   Normal muscle strength    Right Hip   Planes of Motion   Flexion: 4-  Extension: 3+  Abduction: 3+  External rotation: 4-  Internal rotation: 4-    Isolated Muscles   Gluteus medius: 3+    Additional Strength Details  Core: no greater than 2/5 at this time, limited by low back discomfort  Ambulation     Ambulation: Level Surfaces     Additional Level Surfaces Ambulation Details  Mod R sided Trendelenburg, minimally antalgic, decreased step length and WB through R LE     Functional Assessment     Comments  Sit<>stand: decreased WB through R Le, min UE support, min antalgic, tires quickly        Precautions: Born w/ hip dysplasia     Daily Treatment Diary     Manual              LAD             "the stick" to quad, ITB                                                        Exercise Diary              SLR w/ QS             bridging             Hip add squeeze             Supine single leg fall outs (gentle)             Standing mini squats             Hip abd and ext             pball isos             pball squats              Stair training              Marching                                                                                                                                                    Modalities No

## 2022-05-14 ENCOUNTER — NURSE TRIAGE (OUTPATIENT)
Dept: OTHER | Facility: OTHER | Age: 57
End: 2022-05-14

## 2022-05-15 NOTE — TELEPHONE ENCOUNTER
Regarding: MAB order  ----- Message from [facility] sent at 5/14/2022  5:46 PM EDT -----  "I need a MAB order"

## 2022-05-15 NOTE — TELEPHONE ENCOUNTER
Symptomatic  Onset 5/10: COVID+ on 5/11  Sore throat, sneezing, nasal congestion/drainage  Head cold  Chest congestion  Denies SOB/breathing difficulties  Denies Chest pain  Seeking care/treatment advise and virtual appointment  Virtual appointment made on 5/16 for COVID+/Treatment  Care advice given  Informed to call back if worsening symptoms  Verbalized understanding and agreeable with disposition  No further questions

## 2022-05-15 NOTE — TELEPHONE ENCOUNTER
Reason for Disposition   [1] COVID-19 diagnosed by positive lab test (e g , PCR, rapid self-test kit) AND [2] mild symptoms (e g , cough, fever, others) AND [4] no complications or SOB    Answer Assessment - Initial Assessment Questions  Were you within 6 feet or less, for up to 15 minutes or more with a person that has a confirmed COVID-19 test?     n/a              What was the date of your exposure?            n/a       Are you experiencing any symptoms attributed to the virus?  (Assess for SOB, cough, fever, difficulty breathing)            Onset 5/10: COVID+ on 5/11  Sore throat, sneezing, nasal congestion/drainage  Head cold  Chest congestion           HIGH RISK: Do you have any history heart or lung conditions, weakened immune system, diabetes, Asthma, CHF, HIV, COPD, Chemo, renal failure, sickle cell, etc?     Asthma    Protocols used: CORONAVIRUS (COVID-19) DIAGNOSED OR SUSPECTED-ADULT-

## 2022-05-16 NOTE — TELEPHONE ENCOUNTER
We need to reach out to Lucila Gregory and inquire if she is being taken care of  Abby Zavala spoke with her and she told Abby Zavala she was supposed to be scheduled for yesterday  Abby Zavala thought she was scheduled at the wrong office  Lucila Gregory told her that her doctor would just take care of it   We need to confirm that she does not need an appt here [facility]

## 2022-05-17 NOTE — TELEPHONE ENCOUNTER
Spoke to nasir, she says she is feeling better and has been following with her pcp  She does not need or want an appt at our office  She was thankful for the follow up  There is not further action required     Yaima Lawson MA